# Patient Record
Sex: FEMALE | Race: WHITE | NOT HISPANIC OR LATINO | Employment: OTHER | URBAN - METROPOLITAN AREA
[De-identification: names, ages, dates, MRNs, and addresses within clinical notes are randomized per-mention and may not be internally consistent; named-entity substitution may affect disease eponyms.]

---

## 2017-01-26 ENCOUNTER — OFFICE VISIT (OUTPATIENT)
Dept: FAMILY MEDICINE | Facility: CLINIC | Age: 57
End: 2017-01-26

## 2017-01-26 VITALS
WEIGHT: 174 LBS | HEIGHT: 72 IN | SYSTOLIC BLOOD PRESSURE: 120 MMHG | HEART RATE: 71 BPM | TEMPERATURE: 97.8 F | BODY MASS INDEX: 23.57 KG/M2 | OXYGEN SATURATION: 99 % | DIASTOLIC BLOOD PRESSURE: 80 MMHG

## 2017-01-26 DIAGNOSIS — M77.52 TENDINITIS OF LEFT FOOT: Primary | ICD-10-CM

## 2017-01-26 PROCEDURE — 99213 OFFICE O/P EST LOW 20 MIN: CPT | Performed by: PHYSICIAN ASSISTANT

## 2017-01-26 PROCEDURE — 73630 X-RAY EXAM OF FOOT: CPT | Mod: LT | Performed by: PHYSICIAN ASSISTANT

## 2017-01-26 NOTE — PATIENT INSTRUCTIONS
For the next two weeks:  Take 1 tablet of naproxen/Aleve (220 mg) twice daily with food.  Heat the area 2 times for 20 minutes.  Try to elevate leg as much as possible.  Continue to wear good quality shoes in the house.  Consider wearing compression sleeves

## 2017-01-26 NOTE — PROGRESS NOTES
"CC: Foot problem    History:  For the past 2-3 weeks, Jennifer has been having pain on the top of the left foot. It gets swollen and has some red discoloration.     Jennifer has been taking advil to help with pain. Has tried icing and elevating the leg without significant relief. Has to take Advil when she has been on her foot for the day. Wears halflinger shoes from [employer]. Does some ROM exercises. No trauma to the area, break in the skin.    Was on bio identical hormone for the last 2 years, and recently went off this, and is getting hot flashes. Sees Dr. Prakash for GYN at [facility]. Was being seen in a menopause clinic. She feels like she has been getting increased hot flashes, and achiness including shooting pains down her legs.      PMH, MEDICATIONS, ALLERGIES, SOCIAL AND FAMILY HISTORY in [facility] and reviewed by me personally.      ROS negative other than the symptoms noted above in the HPI.        Examination   /80 mmHg  Pulse 71  Temp(Src) 97.8  F (36.6  C) (Oral)  Ht 1.854 m (6' 0.99\")  Wt 78.926 kg (174 lb)  BMI 22.96 kg/m2  SpO2 99%  Breastfeeding? No       Constitutional: Sitting comfortably, in no acute distress. Vital signs noted  M/S: Tender to palpation over left dorsal foot over phalynx of digits 3-5. Mild erythema and edema appreciated. ROM and strength of ankle, toes intact.  NEURO:  muscle strength normal, reflexes normal and symmetric  SKIN: No jaundice/pallor/rash.   Psychiatric: mentation appears normal and affect normal/bright        A/P    ICD-10-CM    1. Tendinitis of left foot M77.52 X-ray lt Foot G/E 3 vws*       DISCUSSION: X-ray of foot does not show any obvious abnormalities including fractures or stress fractures. Radiologist report confirms this. I believe this to be tendonitis. I recommended for the next two weeks:  Take 1 tablet of naproxen/Aleve (220 mg) twice daily with food.  Heat the area 2 times for 20 minutes.  Try to elevate leg as much as possible.  Continue to " wear good quality shoes in the house, and when active.  Consider wearing compression sleeves to help with swelling.    I would like her to contact me in 2 weeks if she is not significantly better despite these interventions.    follow up visit: As needed    [facility]

## 2017-01-26 NOTE — MR AVS SNAPSHOT
After Visit Summary   1/26/2017    Skyla Lopez    MRN: [id number]           Patient Information     Date Of Birth          1960        Visit Information        Provider Department      1/26/2017 11:30 AM Nicole Coleman PA-C [facility]        Today's Diagnoses     Right foot pain    -  1       Care Instructions    For the next two weeks:  Take 1 tablet of naproxen/Aleve (220 mg) twice daily with food.  Heat the area 2 times for 20 minutes.  Try to elevate leg as much as possible.  Continue to wear good quality shoes in the house.  Consider wearing compression sleeves        Follow-ups after your visit        Who to contact     If you have questions or need follow up information about today's clinic visit or your schedule please contact [facility] directly at [phone].  Normal or non-critical lab and imaging results will be communicated to you by Eniramhart, letter or phone within 4 business days after the clinic has received the results. If you do not hear from us within 7 days, please contact the clinic through Pieceablet or phone. If you have a critical or abnormal lab result, we will notify you by phone as soon as possible.  Submit refill requests through zerobound or call your pharmacy and they will forward the refill request to us. Please allow 3 business days for your refill to be completed.          Additional Information About Your Visit        MyChart Information     zerobound gives you secure access to your electronic health record. If you see a primary care provider, you can also send messages to your care team and make appointments. If you have questions, please call your primary care clinic.  If you do not have a primary care provider, please call [phone] and they will assist you.        Care EveryWhere ID     This is your Care EveryWhere ID. This could be used by other organizations to access your [facility]  "records  [id number]        Your Vitals Were     Pulse Temperature Height BMI (Body Mass Index) Pulse Oximetry Breastfeeding?    71 97.8  F (36.6  C) (Oral) 1.854 m (6' 0.99\") 22.96 kg/m2 99% No       Blood Pressure from Last 3 Encounters:   01/26/17 120/80   08/31/16 122/70   04/01/16 112/70    Weight from Last 3 Encounters:   01/26/17 78.926 kg (174 lb)   08/31/16 79.289 kg (174 lb 12.8 oz)   04/01/16 80.343 kg (177 lb 2 oz)              We Performed the Following     X-ray lt Foot G/E 3 vws*        Primary Care Provider Office Phone # Fax #    Cuca Wyatt [phone]       [facility] [address]  [facility] [address]        Thank you!     Thank you for choosing [facility]  for your care. Our goal is always to provide you with excellent care. Hearing back from our patients is one way we can continue to improve our services. Please take a few minutes to complete the written survey that you may receive in the mail after your visit with us. Thank you!             Your Updated Medication List - Protect others around you: Learn how to safely use, store and throw away your medicines at www.disposemymeds.org.          This list is accurate as of: 1/26/17 12:30 PM.  Always use your most recent med list.                   Brand Name Dispense Instructions for use    atenolol 25 MG tablet    TENORMIN    90 tablet    Take 1 tablet (25 mg) by mouth daily       sertraline 100 MG tablet    ZOLOFT     Take 75 mg by mouth daily         "

## 2017-01-26 NOTE — NURSING NOTE
Skyla Lopez is here for left foot pain around the ankle and swelling on the top of the foot as well.    Pre-Visit Screening :  Immunizations : up to date    Colonoscopy : schedule on a later day  Mammogram : is up to date  Asthma Action Test/Plan : NA  PHQ9/GAD7 :  NA    BP done on the left arm, with a reg sized cuff.  Pulse - regular  My Chart - accepts    CLASSIFICATION OF OVERWEIGHT AND OBESITY BY BMI                         Obesity Class           BMI(kg/m2)  Underweight                                    < 18.5  Normal                                         18.5-24.9  Overweight                                     25.0-29.9  OBESITY                     I                  30.0-34.9                              II                 35.0-39.9  EXTREME OBESITY             III                >40                             Patient's  BMI Body mass index is 22.96 kg/(m^2).  http://hin.nhlbi.nih.gov/menuplanner/menu.cgi  Questioned patient about current smoking habits.  Pt. has never smoked.

## 2017-03-02 ENCOUNTER — HOSPITAL ENCOUNTER (OUTPATIENT)
Dept: MAMMOGRAPHY | Facility: CLINIC | Age: 57
Discharge: HOME | End: 2017-03-02
Attending: OBSTETRICS & GYNECOLOGY | Admitting: OBSTETRICS & GYNECOLOGY
Payer: COMMERCIAL

## 2017-03-02 DIAGNOSIS — Z12.31 ENCOUNTER FOR SCREENING MAMMOGRAM FOR HIGH-RISK PATIENT: ICD-10-CM

## 2017-03-02 PROCEDURE — 77063 BREAST TOMOSYNTHESIS BI: CPT

## 2017-04-10 ENCOUNTER — OFFICE VISIT (OUTPATIENT)
Dept: FAMILY MEDICINE | Facility: CLINIC | Age: 57
End: 2017-04-10

## 2017-04-10 VITALS
HEART RATE: 64 BPM | WEIGHT: 177.8 LBS | SYSTOLIC BLOOD PRESSURE: 114 MMHG | BODY MASS INDEX: 23.46 KG/M2 | DIASTOLIC BLOOD PRESSURE: 70 MMHG | OXYGEN SATURATION: 99 % | TEMPERATURE: 97.9 F

## 2017-04-10 DIAGNOSIS — M54.50 CHRONIC MIDLINE LOW BACK PAIN WITHOUT SCIATICA: Primary | ICD-10-CM

## 2017-04-10 DIAGNOSIS — F41.1 GENERALIZED ANXIETY DISORDER: ICD-10-CM

## 2017-04-10 DIAGNOSIS — G89.29 CHRONIC MIDLINE LOW BACK PAIN WITHOUT SCIATICA: Primary | ICD-10-CM

## 2017-04-10 LAB
% GRANULOCYTES: 53.4 %
ALBUMIN (URINE): ABNORMAL MG/DL
APPEARANCE UR: CLEAR
BACTERIA, UR: ABNORMAL
BILIRUB UR QL: ABNORMAL
CASTS/LPF: ABNORMAL
COLOR UR: YELLOW
EP/HPF: ABNORMAL
ERYTHROCYTE [SEDIMENTATION RATE] IN BLOOD: 6 MM/HR (ref 0–20)
GLUCOSE URINE: ABNORMAL MG/DL
HCT VFR BLD AUTO: 40.2 % (ref 35–47)
HEMOGLOBIN: 13.1 G/DL (ref 11.7–15.7)
HGB UR QL: ABNORMAL
KETONES UR QL: ABNORMAL MG/DL
LEUKOCYTE ESTERASE - QUEST: ABNORMAL
LYMPHOCYTES NFR BLD AUTO: 38.2 %
MCH RBC QN AUTO: 30.5 PG (ref 26–33)
MCHC RBC AUTO-ENTMCNC: 32.6 G/DL (ref 31–36)
MCV RBC AUTO: 93.8 FL (ref 78–100)
MISC.: ABNORMAL
MONOCYTES NFR BLD AUTO: 8.4 %
NITRITE UR QL STRIP: ABNORMAL
PH UR STRIP: 7 PH (ref 5–7)
PLATELET COUNT - QUEST: 278 10^9/L (ref 150–375)
RBC # BLD AUTO: 4.29 10*12/L (ref 3.8–5.2)
RBC, UR MICRO: ABNORMAL (ref ?–2)
SP. GRAVITY: 1.01
UROBILINOGEN UR QL STRIP: 0.2 EU/DL (ref 0.2–1)
WBC # BLD AUTO: 6.4 10*9/L (ref 4–11)
WBC, UR MICRO: ABNORMAL (ref ?–2)

## 2017-04-10 PROCEDURE — 85025 COMPLETE CBC W/AUTO DIFF WBC: CPT | Performed by: FAMILY MEDICINE

## 2017-04-10 PROCEDURE — 73521 X-RAY EXAM HIPS BI 2 VIEWS: CPT | Performed by: FAMILY MEDICINE

## 2017-04-10 PROCEDURE — 99214 OFFICE O/P EST MOD 30 MIN: CPT | Performed by: FAMILY MEDICINE

## 2017-04-10 PROCEDURE — 85651 RBC SED RATE NONAUTOMATED: CPT | Performed by: FAMILY MEDICINE

## 2017-04-10 PROCEDURE — 36415 COLL VENOUS BLD VENIPUNCTURE: CPT | Performed by: FAMILY MEDICINE

## 2017-04-10 PROCEDURE — 72110 X-RAY EXAM L-2 SPINE 4/>VWS: CPT | Performed by: FAMILY MEDICINE

## 2017-04-10 PROCEDURE — 81001 URINALYSIS AUTO W/SCOPE: CPT | Performed by: FAMILY MEDICINE

## 2017-04-10 NOTE — MR AVS SNAPSHOT
After Visit Summary   4/10/2017    Skyla Lopez    MRN: [id number]           Patient Information     Date Of Birth          1960        Visit Information        Provider Department      4/10/2017 2:00 PM Cuca Wyatt MD [facility]        Today's Diagnoses     Chronic midline low back pain without sciatica    -  1    Generalized anxiety disorder           Follow-ups after your visit        Future tests that were ordered for you today     Open Future Orders        Priority Expected Expires Ordered    US Pelvic Complete with Transvaginal Routine 7/9/2017 4/10/2018 4/10/2017            Who to contact     If you have questions or need follow up information about today's clinic visit or your schedule please contact [facility] directly at [phone].  Normal or non-critical lab and imaging results will be communicated to you by MyChart, letter or phone within 4 business days after the clinic has received the results. If you do not hear from us within 7 days, please contact the clinic through MyChart or phone. If you have a critical or abnormal lab result, we will notify you by phone as soon as possible.  Submit refill requests through mobifriends or call your pharmacy and they will forward the refill request to us. Please allow 3 business days for your refill to be completed.          Additional Information About Your Visit        MyChart Information     mobifriends gives you secure access to your electronic health record. If you see a primary care provider, you can also send messages to your care team and make appointments. If you have questions, please call your primary care clinic.  If you do not have a primary care provider, please call [phone] and they will assist you.        Care EveryWhere ID     This is your Care EveryWhere ID. This could be used by other organizations to access your [place] medical records  [id number]         Your Vitals Were     Pulse Temperature Pulse Oximetry BMI (Body Mass Index)          64 97.9  F (36.6  C) (Oral) 99% 23.46 kg/m2         Blood Pressure from Last 3 Encounters:   04/10/17 114/70   01/26/17 120/80   08/31/16 122/70    Weight from Last 3 Encounters:   04/10/17 80.6 kg (177 lb 12.8 oz)   01/26/17 78.9 kg (174 lb)   08/31/16 79.3 kg (174 lb 12.8 oz)              We Performed the Following     CL AFF HEMOGRAM/PLATE/DIFF ([facility])     ESR, WESTERGREN ([facility])     HCL URINALYSIS, ROUTINE     VENOUS COLLECTION     XR Lumbar Spine G/E 4 Views     XR Pelvis and Hip Bilateral 2 Views        Primary Care Provider Office Phone # Fax #    Cuca Wyatt [phone]       [facility] [address]  [facility] [address]        Thank you!     Thank you for choosing [facility]  for your care. Our goal is always to provide you with excellent care. Hearing back from our patients is one way we can continue to improve our services. Please take a few minutes to complete the written survey that you may receive in the mail after your visit with us. Thank you!             Your Updated Medication List - Protect others around you: Learn how to safely use, store and throw away your medicines at www.disposemymeds.org.          This list is accurate as of: 4/10/17 11:59 PM.  Always use your most recent med list.                   Brand Name Dispense Instructions for use    atenolol 25 MG tablet    TENORMIN    90 tablet    Take 1 tablet (25 mg) by mouth daily       sertraline 100 MG tablet    ZOLOFT     Take 75 mg by mouth daily

## 2017-04-10 NOTE — PROGRESS NOTES
SUBJECTIVE:  56 year old female who has a history of pelvic pain, treated with PT, also a history of SI strain, presents with the following concern:    Three years ago, coccyx pain- worse with sitting, and getting up out of a chair    Then, pelvic pain- Dr Prakash referred for PT/- pelvic muscles  She also had serial pelvic ultrasounds    In the last couple of weeks, low back pain, midline but with radiation to both hips and anterior into pelvis    Two years of HRT- now off  Worries about GYN etiology of her current symptoms    history of endometriosis-    History of scoliosis  Saw her chiro today, given a low back support belt and adjustment    Patient Active Problem List   Diagnosis     Generalized anxiety disorder     Mitral valve insufficiency and aortic valve insufficiency     Health Care Home     Panic disorder with agoraphobia and moderate panic attacks     Tachycardia     Plantar fasciitis     ACP (advance care planning)     High serum vitamin B12     Endometrial polyp     Past Surgical History:   Procedure Laterality Date     APPENDECTOMY       CRYOPRESERVATION REPRODUCTIVE TISSUE, OVARIAN  1982    left ovary removed/borderline tumor     DILATION AND CURETTAGE, OPERATIVE HYSTEROSCOPY WITH MORCELLATOR, COMBINED N/A 6/4/2015    Procedure: COMBINED DILATION AND CURETTAGE, OPERATIVE HYSTEROSCOPY WITH MORCELLATOR;  Surgeon: Hamida Prakash MD;  Location: [facility] REMOVAL OF TONSILS,12+ Y/O  age 23    Tonsils 13+y.o.     LAPAROSCOPIC FUNDOPLASTY  1990/92/93    endometriosis     Current Outpatient Prescriptions   Medication     atenolol (TENORMIN) 25 MG tablet     sertraline (ZOLOFT) 100 MG tablet     No current facility-administered medications for this visit.       ROS: 7 point ROS neg other than the symptoms noted above in the HPI.  Having mild hot flashes- discussed flaxseed as a possible treatment  History of generalized anxiety- her current symptoms do trigger some anxiety symptoms    OBJECTIVE:  BP  114/70 (BP Location: Left arm, Patient Position: Chair, Cuff Size: Adult Regular)  Pulse 64  Temp 97.9  F (36.6  C) (Oral)  Wt 80.6 kg (177 lb 12.8 oz)  SpO2 99%  BMI 23.46 kg/m2   Has difficulty getting up from chair or exam table when lying  The abdomen is soft without tenderness, guarding, mass or organomegaly. Bowel sounds are normal. No CVA tenderness.    Prominent thoracic lordosis  Normal straight leg raising.  No weakness of the lower extremities; reflexes are reduced but symmmetrical.  Gait is normal.  No palpable muscle spasm or point tenderness of the vertebrae.   passive ROM of both hips without pain.  X-ray: normal lumbar and pelvic x-rays    Assessment   Lumbar strain  Scoliosis  History of endometriosis- follow up with Dr Prakash regarding pelvic ultrasound imaging  Offered ANSAID- defers  Offered referral to PT (she will consider and let me know if she wishes a referral)  For now, follow up with chiro and gyn

## 2017-04-10 NOTE — NURSING NOTE
Skyla is here for back pain.     Pre-Visit Screening :  Immunizations : up to date  Colonoscopy : is up to date  Mammogram : is up to date  Asthma Action Test/Plan : NA  PHQ9/GAD7 :  NA      Pulse - regular  My Chart - accepts    CLASSIFICATION OF OVERWEIGHT AND OBESITY BY BMI                         Obesity Class           BMI(kg/m2)  Underweight                                    < 18.5  Normal                                         18.5-24.9  Overweight                                     25.0-29.9  OBESITY                     I                  30.0-34.9                              II                 35.0-39.9  EXTREME OBESITY             III                >40                             Patient's  BMI There is no height or weight on file to calculate BMI.  http://hin.nhlbi.nih.gov/menuplanner/menu.cgi  Questioned patient about current smoking habits.  Pt. has never smoked.    Emerald.QUETA Putnam ([facility])

## 2017-04-17 ENCOUNTER — OFFICE VISIT (OUTPATIENT)
Dept: FAMILY MEDICINE | Facility: CLINIC | Age: 57
End: 2017-04-17

## 2017-04-17 VITALS
HEART RATE: 69 BPM | TEMPERATURE: 98.2 F | WEIGHT: 175.2 LBS | SYSTOLIC BLOOD PRESSURE: 118 MMHG | OXYGEN SATURATION: 99 % | DIASTOLIC BLOOD PRESSURE: 80 MMHG | BODY MASS INDEX: 23.12 KG/M2

## 2017-04-17 DIAGNOSIS — M53.3 TAIL BONE PAIN: ICD-10-CM

## 2017-04-17 DIAGNOSIS — M54.50 ACUTE RIGHT-SIDED LOW BACK PAIN WITHOUT SCIATICA: Primary | ICD-10-CM

## 2017-04-17 PROCEDURE — 99213 OFFICE O/P EST LOW 20 MIN: CPT | Performed by: PHYSICIAN ASSISTANT

## 2017-04-17 NOTE — NURSING NOTE
Skyla is here for back pain    Pre-Visit Screening :  Immunizations : up to date  Colonoscopy : is up to date  Mammogram : is up to date  Asthma Action Test/Plan : NA  PHQ9/GAD7 :  NA  Pulse - regular  My Chart - accepts    CLASSIFICATION OF OVERWEIGHT AND OBESITY BY BMI                         Obesity Class           BMI(kg/m2)  Underweight                                    < 18.5  Normal                                         18.5-24.9  Overweight                                     25.0-29.9  OBESITY                     I                  30.0-34.9                              II                 35.0-39.9  EXTREME OBESITY             III                >40                             Patient's  BMI Body mass index is 23.12 kg/(m^2).  http://hin.nhlbi.nih.gov/menuplanner/menu.cgi  Questioned patient about current smoking habits.  Pt. has never smoked.

## 2017-04-17 NOTE — MR AVS SNAPSHOT
After Visit Summary   4/17/2017    Skyla Lopez    MRN: [id number]           Patient Information     Date Of Birth          1960        Visit Information        Provider Department      4/17/2017 11:30 AM Nicole Colmean PA-C [facility]        Today's Diagnoses     Acute right-sided low back pain without sciatica    -  1    Tail bone pain           Follow-ups after your visit        Follow-up notes from your care team     Return if symptoms worsen or fail to improve.      Future tests that were ordered for you today     Open Future Orders        Priority Expected Expires Ordered    MR Lumbar Spine w/o & w Contrast Routine  4/17/2018 4/17/2017            Who to contact     If you have questions or need follow up information about today's clinic visit or your schedule please contact [facility] directly at [phone].  Normal or non-critical lab and imaging results will be communicated to you by MyChart, letter or phone within 4 business days after the clinic has received the results. If you do not hear from us within 7 days, please contact the clinic through Mobile Safe Casehart or phone. If you have a critical or abnormal lab result, we will notify you by phone as soon as possible.  Submit refill requests through [employer] or call your pharmacy and they will forward the refill request to us. Please allow 3 business days for your refill to be completed.          Additional Information About Your Visit        MyChart Information     Trig Medical gives you secure access to your electronic health record. If you see a primary care provider, you can also send messages to your care team and make appointments. If you have questions, please call your primary care clinic.  If you do not have a primary care provider, please call [phone] and they will assist you.        Care EveryWhere ID     This is your Care EveryWhere ID. This could be used by other  organizations to access your [facility] medical records  [id number]        Your Vitals Were     Pulse Temperature Pulse Oximetry BMI (Body Mass Index)          69 98.2  F (36.8  C) (Oral) 99% 23.12 kg/m2         Blood Pressure from Last 3 Encounters:   04/17/17 118/80   04/10/17 114/70   01/26/17 120/80    Weight from Last 3 Encounters:   04/17/17 79.5 kg (175 lb 3.2 oz)   04/10/17 80.6 kg (177 lb 12.8 oz)   01/26/17 78.9 kg (174 lb)               Primary Care Provider Office Phone # Fax #    Cuca Wyatt [phone]       [facility] [address] [facility] [address]  [facility] [address]        Thank you!     Thank you for choosing [facility]  for your care. Our goal is always to provide you with excellent care. Hearing back from our patients is one way we can continue to improve our services. Please take a few minutes to complete the written survey that you may receive in the mail after your visit with us. Thank you!             Your Updated Medication List - Protect others around you: Learn how to safely use, store and throw away your medicines at www.disposemymeds.org.          This list is accurate as of: 4/17/17  2:39 PM.  Always use your most recent med list.                   Brand Name Dispense Instructions for use    atenolol 25 MG tablet    TENORMIN    90 tablet    Take 1 tablet (25 mg) by mouth daily       sertraline 100 MG tablet    ZOLOFT     Take 75 mg by mouth daily

## 2017-04-17 NOTE — PROGRESS NOTES
CC: Back pain    History:  For the past 3 weeks, Jennifer has been having significant low back pain that is somewhat focused on right side of her spinal column.     Jennifer saw Dr. Wyatt for this on 4/10 and lumbar and pelvic x-ray was negative. UA, ESR, and CBC were negative as well. Dr. Wyatt recommended physical therapy, but also mentioned that she should have a vaginal US to rule out a gynecological process causing her symptoms. She is scheduled for a pelvic US on Monday, April 24.     The pain radiates into hips bilaterally and she also feels a cramping pain in her lower abdomen. Hurts the worst with standing or sitting for a long time. The pain is not traveling down legs, no numbness or tingling, no loss of bowel or bladder control. Has been icing, heating, (whirlpool), stretching, chiropractor, and acupuncture without much relief. Has been taking 2 tablets of ibuprofen daily.     She also mentions the tip of her tailbone hurts to the touch for the past 4-6 months. No digestion concerns, but does mention that she has not yet had a colonoscopy.    Was on bioidentical hormones until November 2016.    PMH, MEDICATIONS, ALLERGIES, SOCIAL AND FAMILY HISTORY in [facility] and reviewed by me personally.      ROS negative other than the symptoms noted above in the HPI.        Examination   /80 (BP Location: Left arm, Patient Position: Chair, Cuff Size: Adult Regular)  Pulse 69  Temp 98.2  F (36.8  C) (Oral)  Wt 79.5 kg (175 lb 3.2 oz)  SpO2 99%  BMI 23.12 kg/m2       Constitutional: Sitting comfortably, in no acute distress. Vital signs noted  Neck:  no adenopathy, trachea midline and normal to palpation  Cardiovascular:  regular rate and rhythm, no murmurs, clicks, or gallops  Respiratory:  normal respiratory rate and rhythm, lungs clear to auscultation  Abdomen: Abdomen soft, non-tender. BS normal. No masses, organomegaly  M/S: ROM of back full, other than 50% of normal extension, and pain with extension, right  lean, and rotational movements. No CVA tenderness. Tenderness to palpation at L4/L5 level immediately to the right or vertebral column.  NEURO:  muscle strength normal, reflexes normal and symmetric  SKIN: No jaundice/pallor/rash.   Psychiatric: mentation appears normal and affect normal/bright        A/P    ICD-10-CM    1. Acute right-sided low back pain without sciatica M54.5 MR Lumbar Spine w/o & w Contrast    suspiscious for disc abnormality   2. Tail bone pain M53.3 MR Lumbar Spine w/o & w Contrast    ongoing for 4-6 months       DISCUSSION: Jennifer is very uncomfortable with this pain. I recommended she take Aleve 1 tablet twice daily with food, and apply heat 2-3 times daily for 20 mins. She should schedule with [facility] to have ortho consult for low back pain, but in meantime she can get MRI of back at sitting up machine at [facility]. She will hopefully schedule this this week, and then have pelvic ultrasound on Monday as schedule. If both this are negative, we should consider abdominal causes. Jennifer understands and agrees with this plan, and will contact me with any questions, and consider ER if symptoms worsen.    follow up visit: As needed    Nicole Coleman PA-C  [facility]

## 2017-04-17 NOTE — Clinical Note
Please contact [facility] with MRI order. Patient needs standing up MRI located in [facility] for her claustrophobia. Pt is expecting call from [facility] to schedule this week.

## 2017-04-18 ENCOUNTER — MYC MEDICAL ADVICE (OUTPATIENT)
Dept: FAMILY MEDICINE | Facility: CLINIC | Age: 57
End: 2017-04-18

## 2017-04-18 ENCOUNTER — TELEPHONE (OUTPATIENT)
Dept: FAMILY MEDICINE | Facility: CLINIC | Age: 57
End: 2017-04-18

## 2017-04-18 DIAGNOSIS — M53.3 PAIN IN THE COCCYX: Primary | ICD-10-CM

## 2017-04-18 NOTE — TELEPHONE ENCOUNTER
[facility] called to inform Nicole Coleman that they can not perform imaging of the Sacrum Coccyx in the Lumbar MRI that it would have to be ordered separately.  Would you like to place a separate order to have this imaging performed?    Please Advise    [facility] call back: [phone]    Meri Robert CMA

## 2017-04-18 NOTE — TELEPHONE ENCOUNTER
Ordered MRI of sacrum and coccyx. Pt should be informed this may be an additional charge before she has imaging. She may want to check with insurance.

## 2017-04-19 ENCOUNTER — TRANSFERRED RECORDS (OUTPATIENT)
Dept: FAMILY MEDICINE | Facility: CLINIC | Age: 57
End: 2017-04-19

## 2017-04-21 ENCOUNTER — TRANSFERRED RECORDS (OUTPATIENT)
Dept: FAMILY MEDICINE | Facility: CLINIC | Age: 57
End: 2017-04-21

## 2017-04-24 ENCOUNTER — TRANSFERRED RECORDS (OUTPATIENT)
Dept: FAMILY MEDICINE | Facility: CLINIC | Age: 57
End: 2017-04-24

## 2017-06-24 ENCOUNTER — HEALTH MAINTENANCE LETTER (OUTPATIENT)
Age: 57
End: 2017-06-24

## 2017-08-24 ENCOUNTER — OFFICE VISIT (OUTPATIENT)
Dept: FAMILY MEDICINE | Facility: CLINIC | Age: 57
End: 2017-08-24

## 2017-08-24 VITALS
BODY MASS INDEX: 23.3 KG/M2 | SYSTOLIC BLOOD PRESSURE: 116 MMHG | TEMPERATURE: 97.6 F | HEART RATE: 60 BPM | WEIGHT: 172 LBS | OXYGEN SATURATION: 99 % | HEIGHT: 72 IN | DIASTOLIC BLOOD PRESSURE: 80 MMHG

## 2017-08-24 DIAGNOSIS — Z13.220 LIPID SCREENING: Primary | ICD-10-CM

## 2017-08-24 DIAGNOSIS — N95.1 SYMPTOMATIC MENOPAUSAL OR FEMALE CLIMACTERIC STATES: ICD-10-CM

## 2017-08-24 DIAGNOSIS — Z13.1 SCREENING FOR DIABETES MELLITUS: ICD-10-CM

## 2017-08-24 DIAGNOSIS — Z13.9 SCREENING FOR CONDITION: ICD-10-CM

## 2017-08-24 DIAGNOSIS — R00.0 TACHYCARDIA: ICD-10-CM

## 2017-08-24 PROCEDURE — 80061 LIPID PANEL: CPT | Mod: 90 | Performed by: FAMILY MEDICINE

## 2017-08-24 PROCEDURE — 80048 BASIC METABOLIC PNL TOTAL CA: CPT | Mod: 90 | Performed by: FAMILY MEDICINE

## 2017-08-24 PROCEDURE — 99213 OFFICE O/P EST LOW 20 MIN: CPT | Performed by: FAMILY MEDICINE

## 2017-08-24 PROCEDURE — 82607 VITAMIN B-12: CPT | Mod: 90 | Performed by: FAMILY MEDICINE

## 2017-08-24 PROCEDURE — 83735 ASSAY OF MAGNESIUM: CPT | Mod: 90 | Performed by: FAMILY MEDICINE

## 2017-08-24 PROCEDURE — 36415 COLL VENOUS BLD VENIPUNCTURE: CPT | Performed by: FAMILY MEDICINE

## 2017-08-24 PROCEDURE — 82306 VITAMIN D 25 HYDROXY: CPT | Mod: 90 | Performed by: FAMILY MEDICINE

## 2017-08-24 RX ORDER — METOPROLOL SUCCINATE 25 MG/1
25 TABLET, EXTENDED RELEASE ORAL DAILY
Qty: 90 TABLET | Refills: 3 | Status: SHIPPED | OUTPATIENT
Start: 2017-08-24 | End: 2018-08-30

## 2017-08-24 RX ORDER — ATENOLOL 25 MG/1
25 TABLET ORAL DAILY
Qty: 90 TABLET | Refills: 3 | Status: SHIPPED | OUTPATIENT
Start: 2017-08-24 | End: 2018-08-15

## 2017-08-24 NOTE — PATIENT INSTRUCTIONS
Recommended amount of daily Vitamin D3 is 1000 IU    Metoprolol may need to be substituted for atenolol.

## 2017-08-24 NOTE — MR AVS SNAPSHOT
After Visit Summary   8/24/2017    Skyla Lopez    MRN: [id number]           Patient Information     Date Of Birth          1960        Visit Information        Provider Department      8/24/2017 7:30 AM Cuca Wyatt MD [facility]        Today's Diagnoses     Lipid screening    -  1    Screening for diabetes mellitus        Tachycardia        Screening for condition          Care Instructions    Recommended amount of daily Vitamin D3 is 1000 IU    Metoprolol may need to be substituted for atenolol.          Follow-ups after your visit        Who to contact     If you have questions or need follow up information about today's clinic visit or your schedule please contact [facility] directly at [phone].  Normal or non-critical lab and imaging results will be communicated to you by Metaconomyhart, letter or phone within 4 business days after the clinic has received the results. If you do not hear from us within 7 days, please contact the clinic through Metaconomyhart or phone. If you have a critical or abnormal lab result, we will notify you by phone as soon as possible.  Submit refill requests through Pivot3 or call your pharmacy and they will forward the refill request to us. Please allow 3 business days for your refill to be completed.          Additional Information About Your Visit        MyChart Information     Pivot3 gives you secure access to your electronic health record. If you see a primary care provider, you can also send messages to your care team and make appointments. If you have questions, please call your primary care clinic.  If you do not have a primary care provider, please call [phone] and they will assist you.        Care EveryWhere ID     This is your Care EveryWhere ID. This could be used by other organizations to access your [place] medical records  [id number]        Your Vitals Were     Pulse Temperature  "Height Pulse Oximetry Breastfeeding? BMI (Body Mass Index)    60 97.6  F (36.4  C) (Oral) 1.835 m (6' 0.25\") 99% No 23.17 kg/m2       Blood Pressure from Last 3 Encounters:   08/24/17 116/80   04/17/17 118/80   04/10/17 114/70    Weight from Last 3 Encounters:   08/24/17 78 kg (172 lb)   04/17/17 79.5 kg (175 lb 3.2 oz)   04/10/17 80.6 kg (177 lb 12.8 oz)              We Performed the Following     BASIC METABOLIC PANEL (QUEST)     Lipid Profile (QUEST)     Magnesium (QUEST)     VENOUS COLLECTION     VITAMIN B12 (QUEST)     Vitamin D Deficiency          Today's Medication Changes          These changes are accurate as of: 8/24/17  7:54 AM.  If you have any questions, ask your nurse or doctor.               Start taking these medicines.        Dose/Directions    metoprolol 25 MG 24 hr tablet   Commonly known as:  TOPROL-XL   Used for:  Tachycardia   Started by:  Cuca Wyatt MD        Dose:  25 mg   Take 1 tablet (25 mg) by mouth daily   Quantity:  90 tablet   Refills:  3            Where to get your medicines      [facility] [address] [place] [address] AT [place] [address] & [address] [address]    Hours:  24-hours Phone:  [phone]     metoprolol 25 MG 24 hr tablet         Some of these will need a paper prescription and others can be bought over the counter.  Ask your nurse if you have questions.     Bring a paper prescription for each of these medications     atenolol 25 MG tablet                Primary Care Provider Office Phone # Fax #    Cuca Wyatt [phone] [address] [address]        Equal Access to Services     [facility] JACOB AH: Paty Patton, wamartita singh, qaybta kaalrussell roman, frank magaña. So [facility] [phone].    ATENCIÓN: Si habla español, tiene a cano disposición servicios gratuitos de asistencia " lingüística. Kylie al [phone].    We comply with applicable federal civil rights laws and [place] laws. We do not discriminate on the basis of race, color, national origin, age, disability sex, sexual orientation or gender identity.            Thank you!     Thank you for choosing [facility]  for your care. Our goal is always to provide you with excellent care. Hearing back from our patients is one way we can continue to improve our services. Please take a few minutes to complete the written survey that you may receive in the mail after your visit with us. Thank you!             Your Updated Medication List - Protect others around you: Learn how to safely use, store and throw away your medicines at www.disposemymeds.org.          This list is accurate as of: 8/24/17  7:54 AM.  Always use your most recent med list.                   Brand Name Dispense Instructions for use Diagnosis    atenolol 25 MG tablet    TENORMIN    90 tablet    Take 1 tablet (25 mg) by mouth daily    Tachycardia       metoprolol 25 MG 24 hr tablet    TOPROL-XL    90 tablet    Take 1 tablet (25 mg) by mouth daily    Tachycardia

## 2017-08-24 NOTE — PROGRESS NOTES
"SUBJECTIVE:  56 year old female presents with the following concerns    1)Fasting lab work requested:  See orders in epic     2) Health Care Maintenance reviewed  Encouraged patient to get a flu shot    3) refill of atenolol- good control of symptoms  No break through palpitations-  Discussed shortage of atenolol- my need to substitute metoprolol     4)Menopause symptoms: off hormones for a year  Still gets a few hot flashes-   Sleeping could be better- but doing well    5)History of high B12  Not taking Vitamin D (Takes a multivitamin, krill oil, vitamin C, magnesium spray on feet)    Regular walker, good diet    OBJECTIVE:  /80 (BP Location: Left arm, Patient Position: Chair, Cuff Size: Adult Regular)  Pulse 60  Temp 97.6  F (36.4  C) (Oral)  Ht 1.835 m (6' 0.25\")  Wt 78 kg (172 lb)  SpO2 99%  Breastfeeding? No  BMI 23.17 kg/m2   HR 64  Regular rate and  rhythm. S1 and S2 normal, no murmurs, clicks, gallops or rubs. No edema or JVD. Chest is clear; no wheezes or rales.    Assessment   (Z13.220) Lipid screening  (primary encounter diagnosis)  Comment:   Plan: VENOUS COLLECTION            (Z13.1) Screening for diabetes mellitus  Comment:   Plan: BASIC METABOLIC PANEL (QUEST), VENOUS         COLLECTION            (R00.0) Tachycardia  Comment:   Plan: atenolol (TENORMIN) 25 MG tablet, metoprolol         (TOPROL-XL) 25 MG 24 hr tablet            (Z13.9) Screening for condition  Comment:   Plan: Vitamin D Deficiency, Magnesium (QUEST),         VITAMIN B12 (QUEST)                 "

## 2017-08-24 NOTE — NURSING NOTE
Jennifer is here for a fasting medication recheck .    Pre-Visit Screening :  Immunizations : up to date    Colonoscopy : Postponed  Mammogram : is up to date  Asthma Action Test/Plan : NA  PHQ9/GAD7 :  NA      Pulse - regular  My Chart - accepts    CLASSIFICATION OF OVERWEIGHT AND OBESITY BY BMI                         Obesity Class           BMI(kg/m2)  Underweight                                    < 18.5  Normal                                         18.5-24.9  Overweight                                     25.0-29.9  OBESITY                     I                  30.0-34.9                              II                 35.0-39.9  EXTREME OBESITY             III                >40                             Patient's  BMI Body mass index is 23.17 kg/(m^2).  http://hin.nhlbi.nih.gov/menuplanner/menu.cgi  Questioned patient about current smoking habits.  Pt. has never smoked.    QUETA Scott ([facility])

## 2017-08-25 LAB
BUN SERPL-MCNC: 19 MG/DL (ref 7–25)
BUN/CREATININE RATIO: NORMAL (CALC) (ref 6–22)
CALCIUM SERPL-MCNC: 9.6 MG/DL (ref 8.6–10.4)
CHLORIDE SERPLBLD-SCNC: 103 MMOL/L (ref 98–110)
CHOLEST SERPL-MCNC: 233 MG/DL
CHOLEST/HDLC SERPL: 4.7 (CALC)
CO2 SERPL-SCNC: 28 MMOL/L (ref 20–31)
CREAT SERPL-MCNC: 0.86 MG/DL (ref 0.5–1.05)
EGFR AFRICAN AMERICAN - QUEST: 88 ML/MIN/1.73M2
GFR SERPL CREATININE-BSD FRML MDRD: 76 ML/MIN/1.73M2
GLUCOSE - QUEST: 86 MG/DL (ref 65–99)
HDLC SERPL-MCNC: 50 MG/DL
LDLC SERPL CALC-MCNC: 165 MG/DL (CALC)
MAGNESIUM SERPL-MCNC: 2.1 MG/DL (ref 1.5–2.5)
NONHDLC SERPL-MCNC: 183 MG/DL (CALC)
POTASSIUM SERPL-SCNC: 4.4 MMOL/L (ref 3.5–5.3)
SODIUM SERPL-SCNC: 140 MMOL/L (ref 135–146)
TRIGL SERPL-MCNC: 79 MG/DL
VIT B12 SERPL-MCNC: 857 PG/ML (ref 200–1100)
VITAMIN D, 25-OH, TOTAL - QUEST: 37 NG/ML (ref 30–100)

## 2017-11-16 ENCOUNTER — ALLIED HEALTH/NURSE VISIT (OUTPATIENT)
Dept: NURSING | Facility: CLINIC | Age: 57
End: 2017-11-16
Payer: COMMERCIAL

## 2017-11-16 DIAGNOSIS — Z23 NEED FOR PROPHYLACTIC VACCINATION AND INOCULATION AGAINST INFLUENZA: Primary | ICD-10-CM

## 2017-11-16 PROCEDURE — 90686 IIV4 VACC NO PRSV 0.5 ML IM: CPT

## 2017-11-16 PROCEDURE — 90471 IMMUNIZATION ADMIN: CPT

## 2017-11-16 NOTE — MR AVS SNAPSHOT
After Visit Summary   11/16/2017    Skyla Lopez    MRN: [id number]           Patient Information     Date Of Birth          1960        Visit Information        Provider Department      11/16/2017 2:30 PM CHUCK SARAH/LPN [facility]        Today's Diagnoses     Need for prophylactic vaccination and inoculation against influenza    -  1       Follow-ups after your visit        Who to contact     If you have questions or need follow up information about today's clinic visit or your schedule please contact [facility] directly at [phone].  Normal or non-critical lab and imaging results will be communicated to you by [employer]hart, letter or phone within 4 business days after the clinic has received the results. If you do not hear from us within 7 days, please contact the clinic through OneAwayt or phone. If you have a critical or abnormal lab result, we will notify you by phone as soon as possible.  Submit refill requests through InSample or call your pharmacy and they will forward the refill request to us. Please allow 3 business days for your refill to be completed.          Additional Information About Your Visit        MyChart Information     InSample gives you secure access to your electronic health record. If you see a primary care provider, you can also send messages to your care team and make appointments. If you have questions, please call your primary care clinic.  If you do not have a primary care provider, please call [phone] and they will assist you.        Care EveryWhere ID     This is your Care EveryWhere ID. This could be used by other organizations to access your [place] medical records  [id number]         Blood Pressure from Last 3 Encounters:   08/24/17 116/80   04/17/17 118/80   04/10/17 114/70    Weight from Last 3 Encounters:   08/24/17 172 lb (78 kg)   04/17/17 175 lb 3.2 oz (79.5 kg)   04/10/17 177 lb 12.8 oz (80.6 kg)              We  Performed the Following     FLU VAC, SPLIT VIRUS IM > 3 YO (QUADRIVALENT) [12934]     Vaccine Administration, Initial [60910]        Primary Care Provider Office Phone # Fax #    Cuca Wyatt [phone] [address] [address]        Equal Access to Services     [facility]: Hadii aad ku hadasho Soomaali, waaxda luqadaha, qaybta kaalmada adeegyada, waxay idiin hayaan adeeg khsreejenna lajosephn . So [facility] [phone].    ATENCIÓN: Si habla español, tiene a cano disposición servicios gratuitos de asistencia lingüística. [facility] [phone].    We comply with applicable federal civil rights laws and [place] laws. We do not discriminate on the basis of race, color, national origin, age, disability, sex, sexual orientation, or gender identity.            Thank you!     Thank you for choosing [facility]  for your care. Our goal is always to provide you with excellent care. Hearing back from our patients is one way we can continue to improve our services. Please take a few minutes to complete the written survey that you may receive in the mail after your visit with us. Thank you!             Your Updated Medication List - Protect others around you: Learn how to safely use, store and throw away your medicines at www.disposemymeds.org.          This list is accurate as of: 11/16/17  2:46 PM.  Always use your most recent med list.                   Brand Name Dispense Instructions for use Diagnosis    atenolol 25 MG tablet    TENORMIN    90 tablet    Take 1 tablet (25 mg) by mouth daily    Tachycardia       metoprolol 25 MG 24 hr tablet    TOPROL-XL    90 tablet    Take 1 tablet (25 mg) by mouth daily    Tachycardia

## 2018-03-23 ENCOUNTER — OFFICE VISIT (OUTPATIENT)
Dept: SLEEP MEDICINE | Facility: CLINIC | Age: 58
End: 2018-03-23
Payer: COMMERCIAL

## 2018-03-23 VITALS
OXYGEN SATURATION: 100 % | SYSTOLIC BLOOD PRESSURE: 121 MMHG | BODY MASS INDEX: 23.43 KG/M2 | RESPIRATION RATE: 16 BRPM | WEIGHT: 173 LBS | DIASTOLIC BLOOD PRESSURE: 74 MMHG | HEART RATE: 65 BPM | HEIGHT: 72 IN

## 2018-03-23 DIAGNOSIS — F51.04 PSYCHOPHYSIOLOGIC INSOMNIA: Primary | ICD-10-CM

## 2018-03-23 PROCEDURE — 99204 OFFICE O/P NEW MOD 45 MIN: CPT | Performed by: INTERNAL MEDICINE

## 2018-03-23 NOTE — PATIENT INSTRUCTIONS
1. Insomnia     - Follow the following behavioral changes to manage insomnia   - Postpone your bedtime by an hour   - Practice relaxation techniques at bedtime. Do 20 deep breathing exercises and after this visual imagery( imagining yourself in a calm and peaceful place and immersing yourself totally in this situation)  - keep you wake time consistent in the morning   - Consultation with Dr. Aguiar, behavioral sleep specialist    You have symptoms of insomnia and would likely benefit from non-medication approaches to treatment.  Cognitive behavioral treatment (CBT) for insomnia is a very effective technique that involves changing your behaviors and thoughts associated with sleep.  People that are motivated to make changes in their sleep pattern are likely to benefit from self-help strategies for treatment of insomnia.  Several treatment books for insomnia are listed on our patient treatment resources.      Suggested Resources  Insomnia Treatment Books     Overcoming Insomnia by Sergio Elizabeth and Lida Beach (2008)    No More Sleepless Nights by Nilo Irene and Marylu Garza (1996)    Say Mars to Insomnia by Mando Ramirez (2009)    The Insomnia Workbook by Nicole Garcia and Norm Eli (2009)    The Insomnia Answer by Jl Kaur and Logan Hoyos (2006)  You have symptoms of insomnia and would likely benefit from non-medication approaches to treatment.  Cognitive behavioral treatment (CBT) for insomnia is a very effective technique that involves changing your behaviors and thoughts associated with sleep.  People that are motivated to make changes in their sleep pattern are likely to benefit from internet based cognitive behavioral treatment for insomnia.  Internet CBT programs include but are not limited to www.SHUTi.me or www.SleepIO.com.  There is a fee for using these programs that is similar to actually seeing an insomnia expert.  By entering the code  [place]  it will allow us to know  if our patients find these services useful.      Online Programs     www.SHUTi.me (pronounced shut eye). There is a fee for this program.       www.sleepIO.com (pronounced sleep ee oh). There is a fee for this program. Enter the code  [place]  if you decide to enroll in this program.       Your BMI is Body mass index is 22.82 kg/(m^2).  Weight management is a personal decision.  If you are interested in exploring weight loss strategies, the following discussion covers the approaches that may be successful. Body mass index (BMI) is one way to tell whether you are at a healthy weight, overweight, or obese. It measures your weight in relation to your height.  A BMI of 18.5 to 24.9 is in the healthy range. A person with a BMI of 25 to 29.9 is considered overweight, and someone with a BMI of 30 or greater is considered obese. More than two-thirds of American adults are considered overweight or obese.  Being overweight or obese increases the risk for further weight gain. Excess weight may lead to heart disease and diabetes.  Creating and following plans for healthy eating and physical activity may help you improve your health.  Weight control is part of healthy lifestyle and includes exercise, emotional health, and healthy eating habits. Careful eating habits lifelong are the mainstay of weight control. Though there are significant health benefits from weight loss, long-term weight loss with diet alone may be very difficult to achieve- studies show long-term success with dietary management in less than 10% of people. Attaining a healthy weight may be especially difficult to achieve in those with severe obesity. In some cases, medications, devices and surgical management might be considered.  What can you do?  If you are overweight or obese and are interested in methods for weight loss, you should discuss this with your provider.     Consider reducing daily calorie intake by 500 calories.     Keep a food journal.      Avoiding skipping meals, consider cutting portions instead.    Diet combined with exercise helps maintain muscle while optimizing fat loss. Strength training is particularly important for building and maintaining muscle mass. Exercise helps reduce stress, increase energy, and improves fitness. Increasing exercise without diet control, however, may not burn enough calories to loose weight.       Start walking three days a week 10-20 minutes at a time    Work towards walking thirty minutes five days a week     Eventually, increase the speed of your walking for 1-2 minutes at time    In addition, we recommend that you review healthy lifestyles and methods for weight loss available through the National Institutes of Health patient information sites:  http://win.niddk.nih.gov/publications/index.htm    And look into health and wellness programs that may be available through your health insurance provider, employer, local community center, or maureen club.

## 2018-03-23 NOTE — PROGRESS NOTES
Visit Date:   03/23/2018      DATE OF SERVICE: 03/23/2018.      CHIEF COMPLAINT:  Difficulty staying asleep.      HISTORY OF PRESENT ILLNESS:  Mrs. Lopez is a 57-year-old female who presents with a chief complaint of difficulty staying asleep.  The patient reports that she has had sleep difficulties since the onset of menopause more than 6 years ago.  Her insomnia has worsened over the last 3 years.  Her difficulty is with sleep maintenance.  She wakes up for uncertain reasons in the middle of the night and can stay up for 1-3 hours feeling frustrated.      The patient goes to bed at around 9:30 p.m.  She does not have difficulty initiating sleep at this time.  After about 3 hours into the night, she wakes up spontaneously and will have difficulty returning to sleep.  There are no clear precipitating factors for her arousal.  When she wakes up, she has a look at her phone or continues to lie in bed trying to force sleep.  If it has taken for more than an hour, she will then apply some relaxing oil on her feet which apparently helps.  When she gets back into sleep, she sleeps until around 6-630 a.m.  The patient complains of some tiredness during the daytime but denies any excessive sleepiness.  She spends her daytime taking care of her 3-month-old grandchild.  Her [place] Sleepiness Scale Score is 2/24 indicating no daytime sleepiness.      The patient and her  sleep separately.  There is report of mild intermittent snoring.  She denies having snort arousals, gasping or choking episodes in her sleep.  She does not have witnessed apneas.      The patient denies having restless legs symptoms.  There is no history of dream enactment behavior or other parasomnias.      There is a previous history of anxiety related to menopause.  She was briefly on sertraline which has now been discontinued.  She denies any depressive symptoms.      The patient does not drink any coffee or any caffeinated drinks.  She rarely  uses alcohol.      She has taken an over-the-counter Unisom on 2 nights which apparently was helpful.  She has not been on any other pharmacotherapy for insomnia.      PAST MEDICAL HISTORY:   1.  Tachycardia.   2.  Anxiety disorder.      FAMILY HISTORY:  No family history of sleep disorders.      SOCIAL HISTORY:  She is unemployed.  There is no history of smoking.      REVIEW OF SYSTEMS:  A complete review of systems was negative except for postnasal drip.      PHYSICAL EXAMINATION:   CONSTITUTIONAL:  Awake, alert, cooperative, in no apparent distress.   Eyes no icterus, normal conjunctivae, STACIA.     ENT:  Oropharynx is Stevens class II.   CARDIOVASCULAR:  Regular S1 and S2.   PULMONARY:  Chest symmetric.  Lungs clear bilaterally.   NEUROLOGIC:  Alert and oriented x3, no focal neurological deficit.  Cranial nerves are grossly normal.   PSYCHIATRIC:  Mood is anxious with a congruent affect.  Attention and concentration is normal.      ASSESSMENT:  Chronic psychophysiologic insomnia.      The patient's history and progression of symptoms is characteristic of chronic psychophysiologic insomnia manifesting with sleep maintenance difficulty and middle insomnia.  There are no significant medical or psychiatric comorbidities.  The patient has mild tiredness in the daytime is a consequence of her insomnia.  History is negative for symptoms of other primary sleep disorders.  There is a history of mild intermittent snoring but overall risk for sleep apnea is low.      I do not think further testing is indicated.  I recommended cognitive behavioral therapy for management of her insomnia.  I recommended consultation with Dr. Aguiar, the behavioral sleep specialist for further insomnia management.      Meanwhile, I emphasized the following changes to address her insomnia.  I recommended keeping a consistent wake time in the morning.  I recommended postponing bedtime by an hour to increase her homeostatic sleep drive.  We  discussed the regular practice of relaxation techniques, mainly deep breathing exercises and visual imagery and making this a bedtime routine.  I have given her resources regarding books dealing with insomnia as well as Internet CBT I resources.      TREATMENT PLAN:   A referral to Dr. Aguiar, behavioral sleep specialist for cognitive behavioral therapy management of insomnia.      I spent a total of 45 minutes with this patient, with more than 50% spent in counseling.         CAITLYN ARAUJO MD             D: 2018   T: 2018   MT: MD      Name:     PATRICK HESS   MRN:      [id number]        Account:      [id number]   :      1960           Visit Date:   2018      Document: [id number]       cc: Cuca Wyatt MD

## 2018-03-23 NOTE — MR AVS SNAPSHOT
After Visit Summary   3/23/2018    Skyla Lopez    MRN: [id number]           Patient Information     Date Of Birth          1960        Visit Information        Provider Department      3/23/2018 12:30 PM Deniz Kat MD [facility]        Today's Diagnoses     Psychophysiologic insomnia    -  1      Care Instructions    1. Insomnia     - Follow the following behavioral changes to manage insomnia   - Postpone your bedtime by an hour   - Practice relaxation techniques at bedtime. Do 20 deep breathing exercises and after this visual imagery( imagining yourself in a calm and peaceful place and immersing yourself totally in this situation)  - keep you wake time consistent in the morning   - Consultation with Dr. Aguiar, behavioral sleep specialist    You have symptoms of insomnia and would likely benefit from non-medication approaches to treatment.  Cognitive behavioral treatment (CBT) for insomnia is a very effective technique that involves changing your behaviors and thoughts associated with sleep.  People that are motivated to make changes in their sleep pattern are likely to benefit from self-help strategies for treatment of insomnia.  Several treatment books for insomnia are listed on our patient treatment resources.      Suggested Resources  Insomnia Treatment Books     Overcoming Insomnia by Sergio Elizabeth and Lida Beach (2008)    No More Sleepless Nights by Nilo Irene and Marylu Garza (1996)    Say Mars to Insomnia by Mando Ramirez (2009)    The Insomnia Workbook by Nicole Garcia and Norm Eli (2009)    The Insomnia Answer by Jl Kaur and Logan Hoyos (2006)  You have symptoms of insomnia and would likely benefit from non-medication approaches to treatment.  Cognitive behavioral treatment (CBT) for insomnia is a very effective technique that involves changing your behaviors and thoughts associated with sleep.  People that are  motivated to make changes in their sleep pattern are likely to benefit from internet based cognitive behavioral treatment for insomnia.  Internet CBT programs include but are not limited to www.[employer] or www.[employer].  There is a fee for using these programs that is similar to actually seeing an insomnia expert.  By entering the code  [facility]  it will allow us to know if our patients find these services useful.      Online Programs     www.[employer] (pronounced shut eye). There is a fee for this program.       www.sleepIO.com (pronounced sleep ee oh). There is a fee for this program. Enter the code  [facility]  if you decide to enroll in this program.       Your BMI is Body mass index is 22.82 kg/(m^2).  Weight management is a personal decision.  If you are interested in exploring weight loss strategies, the following discussion covers the approaches that may be successful. Body mass index (BMI) is one way to tell whether you are at a healthy weight, overweight, or obese. It measures your weight in relation to your height.  A BMI of 18.5 to 24.9 is in the healthy range. A person with a BMI of 25 to 29.9 is considered overweight, and someone with a BMI of 30 or greater is considered obese. More than two-thirds of American adults are considered overweight or obese.  Being overweight or obese increases the risk for further weight gain. Excess weight may lead to heart disease and diabetes.  Creating and following plans for healthy eating and physical activity may help you improve your health.  Weight control is part of healthy lifestyle and includes exercise, emotional health, and healthy eating habits. Careful eating habits lifelong are the mainstay of weight control. Though there are significant health benefits from weight loss, long-term weight loss with diet alone may be very difficult to achieve- studies show long-term success with dietary management in less than 10% of people. Attaining a healthy weight may be  especially difficult to achieve in those with severe obesity. In some cases, medications, devices and surgical management might be considered.  What can you do?  If you are overweight or obese and are interested in methods for weight loss, you should discuss this with your provider.     Consider reducing daily calorie intake by 500 calories.     Keep a food journal.     Avoiding skipping meals, consider cutting portions instead.    Diet combined with exercise helps maintain muscle while optimizing fat loss. Strength training is particularly important for building and maintaining muscle mass. Exercise helps reduce stress, increase energy, and improves fitness. Increasing exercise without diet control, however, may not burn enough calories to loose weight.       Start walking three days a week 10-20 minutes at a time    Work towards walking thirty minutes five days a week     Eventually, increase the speed of your walking for 1-2 minutes at time    In addition, we recommend that you review healthy lifestyles and methods for weight loss available through the National Institutes of Health patient information sites:  http://win.niddk.nih.gov/publications/index.htm    And look into health and wellness programs that may be available through your health insurance provider, employer, local community center, or maureen club.                Follow-ups after your visit        Additional Services     SLEEP PSYCHOLOGY REFERRAL       Referral Urgency: Next available    Dr. Darshan Aguiar is at the following clinics on the following days:  [facility] - [address] [place] [address]        Thursday and Friday (PLEASE CALL [phone] to schedule an appointment).  [facility] - [address] [place], [place]       Wednesdays   (PLEASE CALL [phone] to schedule an appointment).     Please be aware that coverage of these services is subject to the terms and limitations of your health insurance plan. Call  "member services at your health plan with any benefit or coverage questions.     Please bring the following to your appointment:  >> List of current medications   >> This referral request   >> Any documents/labs given to you for this referral                  Your next 10 appointments already scheduled     Apr 02, 2018 10:10 AM CDT   (Arrive by 9:55 AM)   MA SCREENING BILATERAL W/ ROYAL with [id number]   [facility] ([facility])    [address], [address]  [facility] [id number] [phone]           Three-dimensional (3D) mammograms are available at [facility] locations in [facility], [place], [place], [facility], [place], [place], and [place]. [facility] locations include [place] and [facility] [place] [facility] in [place]. Benefits of 3D mammograms include: - Improved rate of cancer detection - Decreases your chance of having to go back for more tests, which means fewer: - \"False-positive\" results (This means that there is an abnormal area but it isn't cancer.) - Invasive testing procedures, such as a biopsy or surgery - Can provide clearer images of the breast if you have dense breast tissue. 3D mammography is an optional exam that anyone can have with a 2D mammogram. It doesn't replace or take the place of a 2D mammogram. 2D mammograms remain an effective screening test for all women.  Not all insurance companies cover the cost of a 3D mammogram. Check with your insurance.            Jun 12, 2018 10:30 AM CDT   New Sleep Patient with Darshan Aguiar PsyD   [facility] ([facility])    [address]  [address]  [facility] [id number] [phone]              Who to contact     If you have questions or need follow up information about today's clinic visit or your schedule please contact [facility] directly at [phone].  Normal or non-critical lab and imaging results will be " "communicated to you by MyChart, letter or phone within 4 business days after the clinic has received the results. If you do not hear from us within 7 days, please contact the clinic through [employer] or phone. If you have a critical or abnormal lab result, we will notify you by phone as soon as possible.  Submit refill requests through [employer] or call your pharmacy and they will forward the refill request to us. Please allow 3 business days for your refill to be completed.          Additional Information About Your Visit        [employer] Information     [employer] gives you secure access to your electronic health record. If you see a primary care provider, you can also send messages to your care team and make appointments. If you have questions, please call your primary care clinic.  If you do not have a primary care provider, please call [phone] and they will assist you.        Care EveryWhere ID     This is your Care EveryWhere ID. This could be used by other organizations to access your [facility] medical records  [id number]        Your Vitals Were     Pulse Respirations Height Pulse Oximetry BMI (Body Mass Index)       65 16 1.854 m (6' 1\") 100% 22.82 kg/m2        Blood Pressure from Last 3 Encounters:   03/23/18 121/74   08/24/17 116/80   04/17/17 118/80    Weight from Last 3 Encounters:   03/23/18 78.5 kg (173 lb)   08/24/17 78 kg (172 lb)   04/17/17 79.5 kg (175 lb 3.2 oz)              We Performed the Following     SLEEP PSYCHOLOGY REFERRAL        Primary Care Provider Office Phone # Fax #    Cuca Wyatt [phone] [address] [address]        Equal Access to Services     [facility] : Hadii yuni bolanos Sobehzad, waaxda luqadaha, qaybta kaalmada abel, frank dickson . So [facility] [phone].    ATENCIÓN: Si habla español, tiene a cano disposición servicios gratuitos de asistencia lingüística. Llame al [phone].    We " comply with applicable federal civil rights laws and [place] laws. We do not discriminate on the basis of race, color, national origin, age, disability, sex, sexual orientation, or gender identity.            Thank you!     Thank you for choosing [facility]  for your care. Our goal is always to provide you with excellent care. Hearing back from our patients is one way we can continue to improve our services. Please take a few minutes to complete the written survey that you may receive in the mail after your visit with us. Thank you!             Your Updated Medication List - Protect others around you: Learn how to safely use, store and throw away your medicines at www.disposemymeds.org.          This list is accurate as of 3/23/18  1:29 PM.  Always use your most recent med list.                   Brand Name Dispense Instructions for use Diagnosis    atenolol 25 MG tablet    TENORMIN    90 tablet    Take 1 tablet (25 mg) by mouth daily    Tachycardia       metoprolol succinate 25 MG 24 hr tablet    TOPROL-XL    90 tablet    Take 1 tablet (25 mg) by mouth daily    Tachycardia

## 2018-04-25 ENCOUNTER — HOSPITAL ENCOUNTER (OUTPATIENT)
Dept: MAMMOGRAPHY | Facility: CLINIC | Age: 58
Discharge: HOME | End: 2018-04-25
Attending: OBSTETRICS & GYNECOLOGY | Admitting: OBSTETRICS & GYNECOLOGY
Payer: COMMERCIAL

## 2018-04-25 DIAGNOSIS — Z12.31 VISIT FOR SCREENING MAMMOGRAM: ICD-10-CM

## 2018-04-25 PROCEDURE — 77067 SCR MAMMO BI INCL CAD: CPT

## 2018-08-15 DIAGNOSIS — R00.0 TACHYCARDIA: ICD-10-CM

## 2018-08-15 RX ORDER — ATENOLOL 25 MG/1
25 TABLET ORAL DAILY
Qty: 30 TABLET | Refills: 0 | COMMUNITY
Start: 2018-08-15 | End: 2018-08-30

## 2018-08-15 RX ORDER — ATENOLOL 25 MG/1
TABLET ORAL
Qty: 90 TABLET | Refills: 3 | OUTPATIENT
Start: 2018-08-15

## 2018-08-15 NOTE — TELEPHONE ENCOUNTER
[facility]  atenolol (TENORMIN) 25 MG tablet  30 days\  Called pt and let them know due for a ov  [place]  [phone] (home)

## 2018-08-30 ENCOUNTER — OFFICE VISIT (OUTPATIENT)
Dept: FAMILY MEDICINE | Facility: CLINIC | Age: 58
End: 2018-08-30

## 2018-08-30 VITALS
WEIGHT: 170 LBS | HEART RATE: 63 BPM | TEMPERATURE: 98.1 F | DIASTOLIC BLOOD PRESSURE: 78 MMHG | BODY MASS INDEX: 22.43 KG/M2 | OXYGEN SATURATION: 97 % | SYSTOLIC BLOOD PRESSURE: 118 MMHG

## 2018-08-30 DIAGNOSIS — G44.201 ACUTE INTRACTABLE TENSION-TYPE HEADACHE: Primary | ICD-10-CM

## 2018-08-30 DIAGNOSIS — Z13.9 SCREENING FOR CONDITION: ICD-10-CM

## 2018-08-30 DIAGNOSIS — E78.5 HYPERLIPIDEMIA LDL GOAL <130: ICD-10-CM

## 2018-08-30 DIAGNOSIS — F41.1 GENERALIZED ANXIETY DISORDER: ICD-10-CM

## 2018-08-30 DIAGNOSIS — R00.2 PALPITATIONS: ICD-10-CM

## 2018-08-30 DIAGNOSIS — Z79.899 MEDICATION MANAGEMENT: ICD-10-CM

## 2018-08-30 PROCEDURE — 84460 ALANINE AMINO (ALT) (SGPT): CPT | Mod: 90 | Performed by: FAMILY MEDICINE

## 2018-08-30 PROCEDURE — 80061 LIPID PANEL: CPT | Mod: 90 | Performed by: FAMILY MEDICINE

## 2018-08-30 PROCEDURE — 84443 ASSAY THYROID STIM HORMONE: CPT | Mod: 90 | Performed by: FAMILY MEDICINE

## 2018-08-30 PROCEDURE — 80048 BASIC METABOLIC PNL TOTAL CA: CPT | Mod: 90 | Performed by: FAMILY MEDICINE

## 2018-08-30 PROCEDURE — 84439 ASSAY OF FREE THYROXINE: CPT | Mod: 90 | Performed by: FAMILY MEDICINE

## 2018-08-30 PROCEDURE — 36415 COLL VENOUS BLD VENIPUNCTURE: CPT | Performed by: FAMILY MEDICINE

## 2018-08-30 PROCEDURE — 83735 ASSAY OF MAGNESIUM: CPT | Mod: 90 | Performed by: FAMILY MEDICINE

## 2018-08-30 PROCEDURE — 82306 VITAMIN D 25 HYDROXY: CPT | Mod: 90 | Performed by: FAMILY MEDICINE

## 2018-08-30 PROCEDURE — 84481 FREE ASSAY (FT-3): CPT | Mod: 90 | Performed by: FAMILY MEDICINE

## 2018-08-30 PROCEDURE — 99214 OFFICE O/P EST MOD 30 MIN: CPT | Performed by: FAMILY MEDICINE

## 2018-08-30 RX ORDER — CHOLECALCIFEROL (VITAMIN D3) 125 MCG
CAPSULE ORAL
COMMUNITY

## 2018-08-30 RX ORDER — CHLORAL HYDRATE 500 MG
2 CAPSULE ORAL DAILY
COMMUNITY
End: 2024-06-10

## 2018-08-30 RX ORDER — ATENOLOL 25 MG/1
25 TABLET ORAL DAILY
Qty: 90 TABLET | Refills: 3 | Status: SHIPPED | OUTPATIENT
Start: 2018-08-30 | End: 2019-09-05

## 2018-08-30 RX ORDER — SERTRALINE HYDROCHLORIDE 25 MG/1
75 TABLET, FILM COATED ORAL
COMMUNITY
Start: 2018-08-20 | End: 2021-12-30

## 2018-08-30 RX ORDER — CLONAZEPAM 0.5 MG/1
0.25 TABLET ORAL AT BEDTIME
COMMUNITY
Start: 2018-07-23 | End: 2019-10-07

## 2018-08-30 RX ORDER — FAMOTIDINE 20 MG
TABLET ORAL
COMMUNITY

## 2018-08-30 ASSESSMENT — ANXIETY QUESTIONNAIRES
GAD7 TOTAL SCORE: 2
1. FEELING NERVOUS, ANXIOUS, OR ON EDGE: SEVERAL DAYS
3. WORRYING TOO MUCH ABOUT DIFFERENT THINGS: NOT AT ALL
5. BEING SO RESTLESS THAT IT IS HARD TO SIT STILL: NOT AT ALL
IF YOU CHECKED OFF ANY PROBLEMS ON THIS QUESTIONNAIRE, HOW DIFFICULT HAVE THESE PROBLEMS MADE IT FOR YOU TO DO YOUR WORK, TAKE CARE OF THINGS AT HOME, OR GET ALONG WITH OTHER PEOPLE: NOT DIFFICULT AT ALL
2. NOT BEING ABLE TO STOP OR CONTROL WORRYING: NOT AT ALL
6. BECOMING EASILY ANNOYED OR IRRITABLE: NOT AT ALL
7. FEELING AFRAID AS IF SOMETHING AWFUL MIGHT HAPPEN: NOT AT ALL

## 2018-08-30 ASSESSMENT — PATIENT HEALTH QUESTIONNAIRE - PHQ9: 5. POOR APPETITE OR OVEREATING: SEVERAL DAYS

## 2018-08-30 NOTE — PROGRESS NOTES
"SUBJECTIVE:  57 year old female presents for refill of atenolol, prescribed for symptomatic palpitations  She denies symptoms of rapid heart beat- she is noticing an irregular heart beat- consistent with PVC's (feels at night before falling sleep- regular heart beats with an irregular beat interspaced    Routine exams with Dr Prakash  Previous treatment of anxiety (started with onset of menopause)  Has been off of HRT, sertraline for years  This spring, felt anxious again- saw Dr Neal, he restarted sertraline and .25 klonopin    Her daughter is getting  in five weeks- she doesn't feel \"anxious\"  Situational stress, sister in law stage 4 lung cancer, another sister with anxiety    Symptoms of headache, head \"pressure\"- she feels jittery     Other problems: chronic insomnia  Consult with sleep medicine this past March:referral to behavioral sleep specialist    Two to three days a week, she watches her 9 month grandson- purposeful exercise  She does not participate in a scheduled exercise program.    Patient Active Problem List   Diagnosis     Generalized anxiety disorder     Mitral valve insufficiency and aortic valve insufficiency     Health Care Home     Panic disorder with agoraphobia and moderate panic attacks     Tachycardia     Plantar fasciitis     ACP (advance care planning)     High serum vitamin B12     Endometrial polyp     Past Surgical History:   Procedure Laterality Date     APPENDECTOMY       CRYOPRESERVATION REPRODUCTIVE TISSUE, OVARIAN  1982    left ovary removed/borderline tumor     DILATION AND CURETTAGE, OPERATIVE HYSTEROSCOPY WITH MORCELLATOR, COMBINED N/A 6/4/2015    Procedure: COMBINED DILATION AND CURETTAGE, OPERATIVE HYSTEROSCOPY WITH MORCELLATOR;  Surgeon: Hamida Prakash MD;  Location: [facility] REMOVAL OF TONSILS,12+ Y/O  age 23    Tonsils 13+y.o.     LAPAROSCOPIC FUNDOPLASTY  1990/92/93    endometriosis     Current Outpatient Prescriptions   Medication     atenolol " (TENORMIN) 25 MG tablet     clonazePAM (KLONOPIN) 0.5 MG tablet     fish oil-omega-3 fatty acids 1000 MG capsule     Lactobacillus (PROBIOTIC ACIDOPHILUS) CAPS     Multiple Vitamins-Minerals (MULTIVITAMIN ADULT PO)     Plant Sterols and Stanols (CHOLESTOFF PO)     sertraline (ZOLOFT) 25 MG tablet     Vitamin D, Cholecalciferol, 1000 units CAPS     No current facility-administered medications for this visit.    Vitamin D taking 6482-7996 IU daily    She has been attempting to taper from Klonopin  I advised her to continue the dose recommended by her psychiatrist- at least until after her daughter's wedding.     OBJECTIVE:  /78 (BP Location: Left arm, Patient Position: Sitting, Cuff Size: Adult Regular)  Pulse 63  Temp 98.1  F (36.7  C) (Oral)  Wt 77.1 kg (170 lb)  SpO2 97%  BMI 22.43 kg/m2  External ears  and canals clear bilaterally. TM's normal bilaterally. Nose normal without lesions or discharge. Oropharynx normal. Neck supple without palpable adenopathy.  No jaw crepitus with ROM  Regular rate and  rhythm. S1 and S2 normal, no murmurs, clicks, gallops or rubs. No edema or JVD. Chest is clear; no wheezes or rales.  Affect is normal  Myofascial tension across neck and shoulders    Assessment    (G44.201) Acute intractable tension-type headache  (primary encounter diagnosis)  Comment: myofascial tension  Plan: Dental visit recommended: ? TMJ    (F41.1) Generalized anxiety disorder  Comment:   Plan: continue medications as advised by her psychiatrist    (R00.2) Palpitations  Comment:   Plan: VENOUS COLLECTION, ALT (QUEST), BASIC METABOLIC        PANEL (QUEST), atenolol (TENORMIN) 25 MG tablet            (Z79.899) Medication management  Comment:   Plan: VENOUS COLLECTION, ALT (QUEST), BASIC METABOLIC        PANEL (QUEST)            (E78.5) Hyperlipidemia LDL goal <130  Comment:   Plan: Lipid Profile, VENOUS COLLECTION, ALT (QUEST)            (Z13.9) Screening for condition  Comment:   Plan: VENOUS  COLLECTION, Vitamin D deficiency         screening (QUEST), Magnesium (QUEST), TSH         (QUEST), T4 free (Quest), T3, FREE (QUEST)

## 2018-08-30 NOTE — MR AVS SNAPSHOT
After Visit Summary   8/30/2018    Skyla Lopez    MRN: [id number]           Patient Information     Date Of Birth          1960        Visit Information        Provider Department      8/30/2018 8:30 AM Cuca Wyatt MD [facility]        Today's Diagnoses     Acute intractable tension-type headache    -  1    Generalized anxiety disorder        Palpitations        Medication management        Hyperlipidemia LDL goal <130        Screening for condition           Follow-ups after your visit        Who to contact     If you have questions or need follow up information about today's clinic visit or your schedule please contact [facility] directly at [phone].  Normal or non-critical lab and imaging results will be communicated to you by MyChart, letter or phone within 4 business days after the clinic has received the results. If you do not hear from us within 7 days, please contact the clinic through [employer] or phone. If you have a critical or abnormal lab result, we will notify you by phone as soon as possible.  Submit refill requests through [employer] or call your pharmacy and they will forward the refill request to us. Please allow 3 business days for your refill to be completed.          Additional Information About Your Visit        MyChart Information     [employer] gives you secure access to your electronic health record. If you see a primary care provider, you can also send messages to your care team and make appointments. If you have questions, please call your primary care clinic.  If you do not have a primary care provider, please call [phone] and they will assist you.        Care EveryWhere ID     This is your Care EveryWhere ID. This could be used by other organizations to access your [facility] medical records  [id number]        Your Vitals Were     Pulse Temperature Pulse Oximetry BMI (Body Mass Index)          63  98.1  F (36.7  C) (Oral) 97% 22.43 kg/m2         Blood Pressure from Last 3 Encounters:   08/30/18 118/78   03/23/18 121/74   08/24/17 116/80    Weight from Last 3 Encounters:   08/30/18 77.1 kg (170 lb)   03/23/18 78.5 kg (173 lb)   08/24/17 78 kg (172 lb)              We Performed the Following     ALT (QUEST)     BASIC METABOLIC PANEL (QUEST)     Lipid Profile     Magnesium (QUEST)     T3, FREE (QUEST)     T4 free (Quest)     TSH (QUEST)     VENOUS COLLECTION     Vitamin D deficiency screening (QUEST)          Where to get your medicines      These medications were sent to [facility] [address] [place] [address] [address] [facility] [address], [facility] [address]    Hours:  24-hours Phone:  [phone]     atenolol 25 MG tablet          Primary Care Provider Office Phone # Fax #    Cuca Wyatt [phone] [address] [address]        Equal Access to Services     NELSY JAMES AH: Hadii aad ku hadasho Sobehzad, waaxda luqadaha, qaybta kaalmada adeegyada, frank dickson . So [facility] [phone].    ATENCIÓN: Si habla español, tiene a cano disposición servicios gratuitos de asistencia lingüística. LlSelect Medical [facility] [phone].    We comply with applicable federal civil rights laws and [place] laws. We do not discriminate on the basis of race, color, national origin, age, disability, sex, sexual orientation, or gender identity.            Thank you!     Thank you for choosing [facility]  for your care. Our goal is always to provide you with excellent care. Hearing back from our patients is one way we can continue to improve our services. Please take a few minutes to complete the written survey that you may receive in the mail after your visit with us. Thank you!             Your Updated Medication List - Protect others around you: Learn how to safely use, store and throw  away your medicines at www.disposemymeds.org.          This list is accurate as of 8/30/18 11:59 PM.  Always use your most recent med list.                   Brand Name Dispense Instructions for use Diagnosis    atenolol 25 MG tablet    TENORMIN    90 tablet    Take 1 tablet (25 mg) by mouth daily    Palpitations       CHOLESTOFF PO           clonazePAM 0.5 MG tablet    klonoPIN     Take 0.25 mg by mouth At Bedtime        fish oil-omega-3 fatty acids 1000 MG capsule      Take 2 g by mouth daily        MULTIVITAMIN ADULT PO           PROBIOTIC ACIDOPHILUS Caps           sertraline 25 MG tablet    ZOLOFT     75 mg        Vitamin D (Cholecalciferol) 1000 units Caps

## 2018-08-30 NOTE — NURSING NOTE
Jennifer is here for medication check today.    Pre-visit Screening:  Immunizations:  up to date  Colonoscopy:  is up to date  Mammogram: is up to date  Asthma Action Test/Plan:  No concerns  PHQ9:  PHQ-9 given today   GAD7:  NURIA-7 given today  Questioned patient about current smoking habits Pt. has never smoked.  Ok to leave detailed message on voice mail for today's visit only yes, phone # [phone]

## 2018-08-31 LAB
ALT SERPL-CCNC: 22 U/L (ref 6–29)
BUN SERPL-MCNC: 15 MG/DL (ref 7–25)
BUN/CREATININE RATIO: NORMAL (CALC) (ref 6–22)
CALCIUM SERPL-MCNC: 9.3 MG/DL (ref 8.6–10.4)
CHLORIDE SERPLBLD-SCNC: 107 MMOL/L (ref 98–110)
CHOLEST SERPL-MCNC: 211 MG/DL
CHOLEST/HDLC SERPL: 3.6 (CALC)
CO2 SERPL-SCNC: 29 MMOL/L (ref 20–32)
CREAT SERPL-MCNC: 0.8 MG/DL (ref 0.5–1.05)
EGFR AFRICAN AMERICAN - QUEST: 96 ML/MIN/1.73M2
GFR SERPL CREATININE-BSD FRML MDRD: 82 ML/MIN/1.73M2
GLUCOSE - QUEST: 93 MG/DL (ref 65–99)
HDLC SERPL-MCNC: 59 MG/DL
LDLC SERPL CALC-MCNC: 132 MG/DL (CALC)
MAGNESIUM SERPL-MCNC: 2 MG/DL (ref 1.5–2.5)
NONHDLC SERPL-MCNC: 152 MG/DL (CALC)
POTASSIUM SERPL-SCNC: 4.4 MMOL/L (ref 3.5–5.3)
SODIUM SERPL-SCNC: 141 MMOL/L (ref 135–146)
T3FREE SERPL-MCNC: 3.4 PG/ML (ref 2.3–4.2)
T4, FREE, NON-DIALYSIS - QUEST: 1.1 NG/DL (ref 0.8–1.8)
TRIGL SERPL-MCNC: 92 MG/DL
TSH SERPL-ACNC: 1.14 MIU/L (ref 0.4–4.5)
VITAMIN D, 25-OH, TOTAL - QUEST: 49 NG/ML (ref 30–100)

## 2018-08-31 ASSESSMENT — PATIENT HEALTH QUESTIONNAIRE - PHQ9: SUM OF ALL RESPONSES TO PHQ QUESTIONS 1-9: 1

## 2018-08-31 ASSESSMENT — ANXIETY QUESTIONNAIRES: GAD7 TOTAL SCORE: 2

## 2018-12-18 ENCOUNTER — OFFICE VISIT (OUTPATIENT)
Dept: FAMILY MEDICINE | Facility: CLINIC | Age: 58
End: 2018-12-18

## 2018-12-18 VITALS
TEMPERATURE: 98.1 F | HEART RATE: 64 BPM | OXYGEN SATURATION: 98 % | SYSTOLIC BLOOD PRESSURE: 126 MMHG | WEIGHT: 170 LBS | DIASTOLIC BLOOD PRESSURE: 78 MMHG | BODY MASS INDEX: 22.43 KG/M2

## 2018-12-18 DIAGNOSIS — J01.00 ACUTE NON-RECURRENT MAXILLARY SINUSITIS: Primary | ICD-10-CM

## 2018-12-18 LAB
% GRANULOCYTES: 57.8 %
HCT VFR BLD AUTO: 41.2 % (ref 35–47)
HEMOGLOBIN: 13.4 G/DL (ref 11.7–15.7)
LYMPHOCYTES NFR BLD AUTO: 29.8 %
MCH RBC QN AUTO: 30.3 PG (ref 26–33)
MCHC RBC AUTO-ENTMCNC: 32.5 G/DL (ref 31–36)
MCV RBC AUTO: 93.1 FL (ref 78–100)
MONOCYTES NFR BLD AUTO: 12.4 %
PLATELET COUNT - QUEST: 234 10^9/L (ref 150–375)
RBC # BLD AUTO: 4.42 10*12/L (ref 3.8–5.2)
WBC # BLD AUTO: 4.7 10*9/L (ref 4–11)

## 2018-12-18 PROCEDURE — 85025 COMPLETE CBC W/AUTO DIFF WBC: CPT | Performed by: PHYSICIAN ASSISTANT

## 2018-12-18 PROCEDURE — 99213 OFFICE O/P EST LOW 20 MIN: CPT | Performed by: PHYSICIAN ASSISTANT

## 2018-12-18 PROCEDURE — 36415 COLL VENOUS BLD VENIPUNCTURE: CPT | Performed by: PHYSICIAN ASSISTANT

## 2018-12-18 NOTE — PROGRESS NOTES
SUBJECTIVE:   Skyla Lopez is a 58 year old female who presents to clinic today for the following health issues:    Acute Illness   Acute illness concerns: sinus  Onset: Saturday    Fever: no     Chills/Sweats: no     Headache (location?): yes    Sinus Pressure:YES    Conjunctivitis:  no    Ear Pain: YES: right    Rhinorrhea: YES    Congestion: YES    Sore Throat: YES     Cough: no    Wheeze: no     Decreased Appetite: no     Nausea: no     Vomiting: no     Diarrhea:  no     Dysuria/Freq.: no     Fatigue/Achiness: YES    Sick/Strep Exposure: YES     Therapies Tried and outcome:   Advil    Post nasal drainage, sore throat, coughing, watering of eyes  Clear drainage. 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         Problem list and histories reviewed & adjusted, as indicated.  Additional history: as documented    Patient Active Problem List   Diagnosis     Generalized anxiety disorder     Mitral valve insufficiency and aortic valve insufficiency     Health Care Home     Panic disorder with agoraphobia and moderate panic attacks     Tachycardia     Plantar fasciitis     ACP (advance care planning)     High serum vitamin B12     Endometrial polyp     Past Surgical History:   Procedure Laterality Date     APPENDECTOMY       CRYOPRESERVATION REPRODUCTIVE TISSUE, OVARIAN  1982    left ovary removed/borderline tumor     DILATION AND CURETTAGE, OPERATIVE HYSTEROSCOPY WITH MORCELLATOR, COMBINED N/A 6/4/2015    Procedure: COMBINED DILATION AND CURETTAGE, OPERATIVE HYSTEROSCOPY WITH MORCELLATOR;  Surgeon: Hamida Prakash MD;  Location: [facility] REMOVAL OF TONSILS,12+ Y/O  age 23    Tonsils 13+y.o.     LAPAROSCOPIC FUNDOPLASTY  1990/92/93    endometriosis       Social History     Tobacco Use     Smoking status: Never Smoker     Smokeless tobacco: Never Used   Substance Use Topics     Alcohol use: No     Alcohol/week: 0.0 oz     Family History   Problem  Relation Age of Onset     Prostate Cancer Father      Neurologic Disorder Father         Parkinson's     C.A.D. Father         age 66     Lipids Father         high cholesterol/on Lipitor     Breast Cancer Paternal Aunt          Current Outpatient Medications   Medication Sig Dispense Refill     atenolol (TENORMIN) 25 MG tablet Take 1 tablet (25 mg) by mouth daily 90 tablet 3     clonazePAM (KLONOPIN) 0.5 MG tablet Take 0.25 mg by mouth At Bedtime       fish oil-omega-3 fatty acids 1000 MG capsule Take 2 g by mouth daily       Lactobacillus (PROBIOTIC ACIDOPHILUS) CAPS        Multiple Vitamins-Minerals (MULTIVITAMIN ADULT PO)        Plant Sterols and Stanols (CHOLESTOFF PO)        sertraline (ZOLOFT) 25 MG tablet 75 mg       Vitamin D, Cholecalciferol, 1000 units CAPS        azithromycin (ZITHROMAX) 250 MG tablet Two tablets first day, then one tablet daily for four days. 6 tablet 0     Allergies   Allergen Reactions     Celexa [Citalopram] GI Disturbance     Metoclopramide Hcl      Reglan-SHAKING     Penicillin G Rash     BP Readings from Last 3 Encounters:   12/18/18 126/78   08/30/18 118/78   03/23/18 121/74    Wt Readings from Last 3 Encounters:   12/18/18 77.1 kg (170 lb)   08/30/18 77.1 kg (170 lb)   03/23/18 78.5 kg (173 lb)                    Reviewed and updated as needed this visit by clinical staff  Tobacco  Allergies  Meds  Problems       Reviewed and updated as needed this visit by Provider         ROS:  Constitutional, HEENT, cardiovascular, pulmonary, gi and gu systems are negative, except as otherwise noted.    OBJECTIVE:     /78 (BP Location: Left arm, Patient Position: Sitting, Cuff Size: Adult Large)   Pulse 64   Temp 98.1  F (36.7  C) (Oral)   Wt 77.1 kg (170 lb)   SpO2 98%   BMI 22.43 kg/m    Body mass index is 22.43 kg/m .     GENERAL: healthy, alert and no distress  EYES: Eyes grossly normal to inspection, PERRL and conjunctivae and sclerae normal  HENT: ear canals and TM's  normal, nose and mouth without ulcers or lesions  NECK: no adenopathy, no asymmetry, masses, or scars and thyroid normal to palpation  RESP: lungs clear to auscultation - no rales, rhonchi or wheezes  CV: regular rates and rhythm, normal S1 S2, no S3 or S4 and no murmur, click or rub  MS: no gross musculoskeletal defects noted, no edema  SKIN: no suspicious lesions or rashes  NEURO: Normal strength and tone, mentation intact and speech normal  PSYCH: mentation appears normal, affect normal/bright        ASSESSMENT/PLAN:     (J01.00) Acute non-recurrent maxillary sinusitis  (primary encounter diagnosis)  Comment: new  Plan: CL AFF HEMOGRAM/PLATE/DIFF (BFP), VENOUS         COLLECTION      Symptomatic treatment consisting of increased fluids, oral and nasal decongestants if tolerated, Vicks rub, OTC analgesia,  Netti Pot if copious drainage and relative rest.      Prescription medications: none today- not indicated - call next week if sx persist or if fever> 101    The patient is instructed to call if symptoms not improving, worsen, or fail to resolve after antibiotic treatment if antibiotics were prescribed.          PAULINE Bain  [facility]

## 2018-12-18 NOTE — NURSING NOTE
Jennifer is here today for a URI.    Pre-visit Screening:  Immunizations:  up to date  Colonoscopy:  is up to date  Mammogram: is up to date  Asthma Action Test/Plan:  ANTHONY  PHQ9:  NA  GAD7:  NA  Questioned patient about current smoking habits Pt. has never smoked.  Ok to leave detailed message on voice mail for today's visit only Yes, phone # [phone]

## 2018-12-21 ENCOUNTER — TELEPHONE (OUTPATIENT)
Dept: FAMILY MEDICINE | Facility: CLINIC | Age: 58
End: 2018-12-21

## 2018-12-21 DIAGNOSIS — J01.00 ACUTE NON-RECURRENT MAXILLARY SINUSITIS: Primary | ICD-10-CM

## 2018-12-21 RX ORDER — AZITHROMYCIN 250 MG/1
TABLET, FILM COATED ORAL
Qty: 6 TABLET | Refills: 0 | Status: SHIPPED | OUTPATIENT
Start: 2018-12-21 | End: 2019-10-07

## 2018-12-21 NOTE — TELEPHONE ENCOUNTER
Skyla Amber Ericson called the clinic support line with the following:    Pt states that she is not doing any better and we are coming into a long weekend with the holiday. She is wondering if you can call her back. She is concerned that she is going to get worse, not better     [phone]    Can send to nurse pool

## 2019-06-11 ENCOUNTER — HOSPITAL ENCOUNTER (OUTPATIENT)
Dept: MAMMOGRAPHY | Facility: CLINIC | Age: 59
Discharge: HOME | End: 2019-06-11
Attending: OBSTETRICS & GYNECOLOGY | Admitting: OBSTETRICS & GYNECOLOGY
Payer: COMMERCIAL

## 2019-06-11 DIAGNOSIS — Z12.31 SCREENING MAMMOGRAM, ENCOUNTER FOR: ICD-10-CM

## 2019-06-11 PROCEDURE — 77063 BREAST TOMOSYNTHESIS BI: CPT

## 2019-09-05 DIAGNOSIS — R00.2 PALPITATIONS: ICD-10-CM

## 2019-09-05 RX ORDER — ATENOLOL 25 MG/1
TABLET ORAL
Qty: 30 TABLET | Refills: 0 | COMMUNITY
Start: 2019-09-05 | End: 2019-10-07

## 2019-09-05 NOTE — TELEPHONE ENCOUNTER
[facility]  30 days called in  atenolol (TENORMIN) 25 MG tablet  Pt due for a yearly fasting ov  Rosie  [phone]

## 2019-10-07 ENCOUNTER — OFFICE VISIT (OUTPATIENT)
Dept: FAMILY MEDICINE | Facility: CLINIC | Age: 59
End: 2019-10-07

## 2019-10-07 VITALS
WEIGHT: 177 LBS | BODY MASS INDEX: 23.35 KG/M2 | TEMPERATURE: 98.2 F | DIASTOLIC BLOOD PRESSURE: 62 MMHG | HEART RATE: 77 BPM | OXYGEN SATURATION: 98 % | SYSTOLIC BLOOD PRESSURE: 112 MMHG

## 2019-10-07 DIAGNOSIS — Z79.899 MEDICATION MANAGEMENT: ICD-10-CM

## 2019-10-07 DIAGNOSIS — E78.5 HYPERLIPIDEMIA LDL GOAL <130: ICD-10-CM

## 2019-10-07 DIAGNOSIS — R00.2 PALPITATIONS: ICD-10-CM

## 2019-10-07 PROCEDURE — 99213 OFFICE O/P EST LOW 20 MIN: CPT | Performed by: FAMILY MEDICINE

## 2019-10-07 RX ORDER — ATENOLOL 25 MG/1
25 TABLET ORAL DAILY
Qty: 90 TABLET | Refills: 3 | Status: SHIPPED | OUTPATIENT
Start: 2019-10-07 | End: 2020-09-18

## 2019-10-07 RX ORDER — MULTIVIT-MIN/IRON/FOLIC ACID/K 18-600-40
CAPSULE ORAL
COMMUNITY

## 2019-10-07 NOTE — PROGRESS NOTES
Subjective     Skyla Lopez is a 58 year old female who presents to clinic today for the following health issues:    Refill of metoprolol- treatment for palpitations  Last EKG 2016  Echo 2014  She denies symptoms of rapid heart beat- irregular heart beat, dizzyness  No apparent side effects from atenolol- normal exercise tolerance- walks    Anxiety: under the care of Dr Neal   GYN -former patient of Dr Prakash (sertraline started with onset of menopause)  Off for 7-8 months- restarted and continues on a low dose  Still taking vitamin D3 1000 IU daily        Health Care Maintenance  Has not done colonoscopy- concerned about the prep  No family history of colon cancer  No change in bowel habits, abdominal pain or blood in stools  She will check with her insurance regarding payment for cologuard and contact me if she wishes this ordered    Current Outpatient Medications   Medication     Ascorbic Acid (VITAMIN C) 500 MG CAPS     atenolol (TENORMIN) 25 MG tablet     fish oil-omega-3 fatty acids 1000 MG capsule     Lactobacillus (PROBIOTIC ACIDOPHILUS) CAPS     Multiple Vitamins-Minerals (MULTIVITAMIN ADULT PO)     Plant Sterols and Stanols (CHOLESTOFF PO)     sertraline (ZOLOFT) 25 MG tablet     Vitamin D, Cholecalciferol, 1000 units CAPS     No current facility-administered medications for this visit.      Supplements:  Centrum heart  cholestoff- at [employer]  Vitamin D  Fish oil  Vitamin C 500 mg  probiotic      How many days per week do you miss taking your medication? 0         Patient Active Problem List   Diagnosis     Generalized anxiety disorder     Mitral valve insufficiency and aortic valve insufficiency     Health Care Home     Panic disorder with agoraphobia and moderate panic attacks     Tachycardia     Plantar fasciitis     ACP (advance care planning)     High serum vitamin B12     Endometrial polyp     Past Surgical History:   Procedure Laterality Date     APPENDECTOMY       CRYOPRESERVATION  REPRODUCTIVE TISSUE, OVARIAN  1982    left ovary removed/borderline tumor     DILATION AND CURETTAGE, OPERATIVE HYSTEROSCOPY WITH MORCELLATOR, COMBINED N/A 6/4/2015    Procedure: COMBINED DILATION AND CURETTAGE, OPERATIVE HYSTEROSCOPY WITH MORCELLATOR;  Surgeon: Hamida Prakash MD;  Location: [facility] REMOVAL OF TONSILS,12+ Y/O  age 23    Tonsils 13+y.o.     LAPAROSCOPIC FUNDOPLASTY  1990/92/93    endometriosis       Social History     Tobacco Use     Smoking status: Never Smoker     Smokeless tobacco: Never Used   Substance Use Topics     Alcohol use: No     Alcohol/week: 0.0 standard drinks     Family History   Problem Relation Age of Onset     Prostate Cancer Father      Neurologic Disorder Father         Parkinson's     C.A.D. Father         age 66     Lipids Father         high cholesterol/on Lipitor     Breast Cancer Paternal Aunt            PROBLEMS reviewed and updated as needed this visit by Provider         Review of Systems   ROS COMP: Constitutional, HEENT, cardiovascular, pulmonary, gi and gu systems are negative, except as otherwise noted.  Gaining weight- less than 5 pounds  Anxiety controlled    Family history:  Mother has atrial fib  Sisters have tachycardia      Objective    /62 (BP Location: Right arm, Patient Position: Sitting, Cuff Size: Adult Large)   Pulse 77   Temp 98.2  F (36.8  C) (Oral)   Wt 80.3 kg (177 lb)   SpO2 98%   BMI 23.35 kg/m    There is no height or weight on file to calculate BMI.  Physical Exam   Pulse is regular  Regular rate and  rhythm. S1 and S2 normal, no murmurs, clicks, gallops or rubs. No edema or JVD. Chest is clear; no wheezes or rales.  EKG discussed, deferred based on normal exam and no symptoms of tachycardia    Diagnostic Test Results:  Labs reviewed in Epic  No results found for this or any previous visit (from the past 24 hour(s)).        Assessment & Plan   (R00.2) Palpitations  Comment:    Plan: atenolol (TENORMIN) 25 MG tablet, TSH with  free        T4 reflex (QUEST), Comprehensive metabolic         panel, VENOUS COLLECTION             (Z79.899) Medication management  Comment:    Plan: Comprehensive metabolic panel, VENOUS         COLLECTION            (E78.5) Hyperlipidemia LDL goal <130  Comment:    Plan: VENOUS COLLECTION, Lipid Panel (BFP)                 FUTURE LABS:orders in epic- she will return for fasting labs   Recheck in one year for refill of atenolol  Colon cancer screening encouraged  Cuca Wyatt MD  [facility]

## 2019-10-07 NOTE — NURSING NOTE
Chief Complaint   Patient presents with     Recheck Medication     not fasting, refill     Pre-visit Screening:  Immunizations:  up to date  Colonoscopy:  needs to do, declined  Mammogram: is up to date  Asthma Action Test/Plan:  ANTHONY  PHQ9:  PHQ-2 given  GAD7:  NA  Questioned patient about current smoking habits Pt. has never smoked.  Ok to leave detailed message on voice mail for today's visit only yes, phone # [phone]

## 2019-10-23 DIAGNOSIS — R00.2 PALPITATIONS: ICD-10-CM

## 2019-10-23 DIAGNOSIS — Z79.899 MEDICATION MANAGEMENT: ICD-10-CM

## 2019-10-23 DIAGNOSIS — E78.5 HYPERLIPIDEMIA LDL GOAL <130: ICD-10-CM

## 2019-10-23 LAB
ALBUMIN SERPL-MCNC: 4.3 G/DL (ref 3.6–5.1)
ALBUMIN/GLOB SERPL: 1.7 {RATIO} (ref 1–2.5)
ALP SERPL-CCNC: 55 U/L (ref 33–130)
ALT 1742-6: 6 U/L (ref 5–30)
AST 1920-8: 7 U/L (ref 7–31)
BILIRUB SERPL-MCNC: 0.7 MG/DL (ref 0.2–1.2)
BUN SERPL-MCNC: 14 MG/DL (ref 7–25)
BUN/CREATININE RATIO: 15.7 (ref 6–22)
CALCIUM SERPL-MCNC: 8.9 MG/DL (ref 8.6–10.3)
CHLORIDE SERPLBLD-SCNC: 106 MMOL/L (ref 98–110)
CHOLEST SERPL-MCNC: 214 MG/DL (ref 0–199)
CHOLEST/HDLC SERPL: 4 {RATIO} (ref 0–5)
CO2 SERPL-SCNC: 31.7 MMOL/L (ref 20–32)
CREAT SERPL-MCNC: 0.91 MG/DL (ref 0.7–1.18)
GLOBULIN, CALCULATED - QUEST: 2.6 (ref 1.9–3.7)
GLUCOSE SERPL-MCNC: 95 MG/DL (ref 60–99)
HDLC SERPL-MCNC: 58 MG/DL (ref 40–150)
LDLC SERPL CALC-MCNC: 137 MG/DL (ref 0–130)
POTASSIUM SERPL-SCNC: 4.42 MMOL/L (ref 3.5–5.3)
PROT SERPL-MCNC: 6.9 G/DL (ref 6.1–8.1)
SODIUM SERPL-SCNC: 143.2 MMOL/L (ref 135–146)
TRIGL SERPL-MCNC: 96 MG/DL (ref 0–149)

## 2019-10-23 PROCEDURE — 80053 COMPREHEN METABOLIC PANEL: CPT | Performed by: FAMILY MEDICINE

## 2019-10-23 PROCEDURE — 36415 COLL VENOUS BLD VENIPUNCTURE: CPT | Performed by: FAMILY MEDICINE

## 2019-10-23 PROCEDURE — 80061 LIPID PANEL: CPT | Performed by: FAMILY MEDICINE

## 2019-10-23 PROCEDURE — 84443 ASSAY THYROID STIM HORMONE: CPT | Mod: 90 | Performed by: FAMILY MEDICINE

## 2019-10-24 LAB — TSH SERPL-ACNC: 1.02 MIU/L (ref 0.4–4.5)

## 2019-12-16 ENCOUNTER — HEALTH MAINTENANCE LETTER (OUTPATIENT)
Age: 59
End: 2019-12-16

## 2020-05-14 ENCOUNTER — TELEPHONE (OUTPATIENT)
Dept: FAMILY MEDICINE | Facility: CLINIC | Age: 60
End: 2020-05-14

## 2020-05-14 NOTE — TELEPHONE ENCOUNTER
Pt called stating she has had a hemorrhoid for 2 wks and is now having low back pain.  She made an appt for Tuesday to be seen.  She states it is not painfull, not bleeding does not bother her with BM's but wants to have it checked as it is not going away.  Will see Chiropractor for her back pain today

## 2020-05-19 ENCOUNTER — OFFICE VISIT (OUTPATIENT)
Dept: FAMILY MEDICINE | Facility: CLINIC | Age: 60
End: 2020-05-19

## 2020-05-19 VITALS
RESPIRATION RATE: 18 BRPM | WEIGHT: 176.8 LBS | HEIGHT: 72 IN | SYSTOLIC BLOOD PRESSURE: 114 MMHG | DIASTOLIC BLOOD PRESSURE: 70 MMHG | HEART RATE: 78 BPM | BODY MASS INDEX: 23.95 KG/M2 | OXYGEN SATURATION: 98 % | TEMPERATURE: 98.2 F

## 2020-05-19 DIAGNOSIS — K64.1 GRADE II INTERNAL HEMORRHOIDS: Primary | ICD-10-CM

## 2020-05-19 DIAGNOSIS — Z12.11 SPECIAL SCREENING FOR MALIGNANT NEOPLASMS, COLON: ICD-10-CM

## 2020-05-19 PROCEDURE — 99213 OFFICE O/P EST LOW 20 MIN: CPT | Performed by: FAMILY MEDICINE

## 2020-05-19 ASSESSMENT — MIFFLIN-ST. JEOR: SCORE: 1525.71

## 2020-05-19 NOTE — NURSING NOTE
Jennifer is here today for hemorrhoids.    Pre-visit Screening:  Immunizations:  up to date  Colonoscopy:  is up to date  Mammogram: is up to date  Asthma Action Test/Plan:  ANTHONY  PHQ9:  NA  GAD7:  NA  Questioned patient about current smoking habits Pt. has never smoked.  Ok to leave detailed message on voice mail for today's visit only Yes, phone # [phone]

## 2020-05-19 NOTE — PATIENT INSTRUCTIONS
1) Grade 2 internal hemorrhoid (primary encounter diagnosis)  Comment: diagnosis reviewed  Plan: Follow handout and monitor for resolution. Worrisome symptoms reviewed    (Z12.11) Special screening for malignant neoplasms, colon  Comment: schedule first colonoscopy  Plan: GASTROENTEROLOGY ADULT REF PROCEDURE ONLY         Other; [facility] [phone]

## 2020-05-19 NOTE — PROGRESS NOTES
SUBJECTIVE: 59 year old female complaining of a lump noted on the rectum for 1 month(s).   The patient describes findings it when wiping after a BM. Minimal itching and no pain. No melena noted. Chronic constipation for which she uses prunes on occasion.   The patient denies a history of abdominal pain, bleeding, fever or urinary issues.   Smoking history: No.   Relevant past medical history: positive for never has had a colonoscopy.    OBJECTIVE: The patient appears healthy, alert, no distress, cooperative and smiling.   CHEST: Regular rate and  rhythm. S1 and S2 normal, no murmurs, clicks, gallops or rubs. No edema or JVD. Chest is clear; no wheezes or rales.  ABD: The abdomen is soft without tenderness, guarding, mass or organomegaly. Bowel sounds are normal. No CVA tenderness or inguinal adenopathy noted.  RECTUM: Small 1 cm internal hemorrhoid with no pain. Rectum normal, negative guaiac.  BMI : Body mass index is 22.7 kg/m .      ASSESSMENT: (K64.1) Grade 2 internal hemorrhoid (primary encounter diagnosis)  Comment: diagnosis reviewed  Plan: Follow handout and monitor for resolution. Worrisome symptoms reviewed    (Z12.11) Special screening for malignant neoplasms, colon  Comment: schedule first colonoscopy  Plan: GASTROENTEROLOGY ADULT REF PROCEDURE ONLY         Other; [facility] [phone]

## 2020-09-16 ENCOUNTER — HOSPITAL ENCOUNTER (OUTPATIENT)
Dept: MAMMOGRAPHY | Facility: CLINIC | Age: 60
Discharge: HOME | End: 2020-09-16
Attending: FAMILY MEDICINE | Admitting: FAMILY MEDICINE
Payer: COMMERCIAL

## 2020-09-16 DIAGNOSIS — Z12.31 VISIT FOR SCREENING MAMMOGRAM: ICD-10-CM

## 2020-09-16 PROCEDURE — 77063 BREAST TOMOSYNTHESIS BI: CPT

## 2020-09-18 ENCOUNTER — OFFICE VISIT (OUTPATIENT)
Dept: FAMILY MEDICINE | Facility: CLINIC | Age: 60
End: 2020-09-18

## 2020-09-18 VITALS
WEIGHT: 179.8 LBS | SYSTOLIC BLOOD PRESSURE: 110 MMHG | DIASTOLIC BLOOD PRESSURE: 70 MMHG | HEART RATE: 69 BPM | TEMPERATURE: 98 F | HEIGHT: 72 IN | OXYGEN SATURATION: 98 % | BODY MASS INDEX: 24.35 KG/M2

## 2020-09-18 DIAGNOSIS — R00.2 PALPITATIONS: Primary | ICD-10-CM

## 2020-09-18 DIAGNOSIS — Z13.228 SCREENING FOR METABOLIC DISORDER: ICD-10-CM

## 2020-09-18 DIAGNOSIS — E78.5 HYPERLIPIDEMIA LDL GOAL <100: ICD-10-CM

## 2020-09-18 DIAGNOSIS — Z11.59 NEED FOR HEPATITIS C SCREENING TEST: ICD-10-CM

## 2020-09-18 PROCEDURE — 99213 OFFICE O/P EST LOW 20 MIN: CPT | Performed by: PHYSICIAN ASSISTANT

## 2020-09-18 RX ORDER — ATENOLOL 25 MG/1
25 TABLET ORAL DAILY
Qty: 90 TABLET | Refills: 3 | Status: SHIPPED | OUTPATIENT
Start: 2020-09-18 | End: 2021-10-04

## 2020-09-18 ASSESSMENT — ANXIETY QUESTIONNAIRES
5. BEING SO RESTLESS THAT IT IS HARD TO SIT STILL: NOT AT ALL
GAD7 TOTAL SCORE: 0
1. FEELING NERVOUS, ANXIOUS, OR ON EDGE: NOT AT ALL
7. FEELING AFRAID AS IF SOMETHING AWFUL MIGHT HAPPEN: NOT AT ALL
6. BECOMING EASILY ANNOYED OR IRRITABLE: NOT AT ALL
3. WORRYING TOO MUCH ABOUT DIFFERENT THINGS: NOT AT ALL
2. NOT BEING ABLE TO STOP OR CONTROL WORRYING: NOT AT ALL

## 2020-09-18 ASSESSMENT — PATIENT HEALTH QUESTIONNAIRE - PHQ9
SUM OF ALL RESPONSES TO PHQ QUESTIONS 1-9: 0
5. POOR APPETITE OR OVEREATING: NOT AT ALL

## 2020-09-18 ASSESSMENT — MIFFLIN-ST. JEOR: SCORE: 1539.32

## 2020-09-18 NOTE — PROGRESS NOTES
"CC: Medication Check    History:  Palpitations:  Takes atenolol 25 mg daily to help with palpitations. This is working well to control symptoms. Denies any side effects.     Anxiety:  Sees Dr. Neal every 4-5 months for sertraline. Working well. May consider having us take over managing this medication in the future.     PMH, MEDICATIONS, ALLERGIES, SOCIAL AND FAMILY HISTORY in [facility] and reviewed by me personally.    ROS negative other than the symptoms noted above in the HPI.    Examination   /70 (BP Location: Left arm, Patient Position: Sitting, Cuff Size: Adult Large)   Pulse 69   Temp 98  F (36.7  C) (Oral)   Ht 1.88 m (6' 2\")   Wt 81.6 kg (179 lb 12.8 oz)   SpO2 98%   BMI 23.08 kg/m       Constitutional: Sitting comfortably, in no acute distress. Vital signs noted  Neck:  no adenopathy, trachea midline and normal to palpation, thyroid normal to palpation  Cardiovascular:  regular rate and rhythm, no murmurs, clicks, or gallops  Respiratory:  normal respiratory rate and rhythm, lungs clear to auscultation  SKIN: No jaundice/pallor/rash.   Psychiatric: mentation appears normal and affect normal/bright      A/P    ICD-10-CM    1. Hyperlipidemia LDL goal <130  E78.5    2. Palpitations  R00.2 atenolol (TENORMIN) 25 MG tablet   3. Hyperlipidemia LDL goal <100  E78.5 VENOUS COLLECTION     Lipid Panel (BFP)   4. Screening for metabolic disorder  Z13.228 VENOUS COLLECTION     Basic Metabolic Panel (BFP)       DISCUSSION:  Palpitations:  Patient is doing well today on atenolol. Agreed to refill without change for 1 year. Agreed to have her return next week to update fasting labs, and she has history of elevated LDL. I will contact pt via [facility] with fasting labs results when available.    Considering Cologard or colonoscopy.      follow up visit: 1 year    Nicole Coleman PA-C  [facility]    "

## 2020-09-18 NOTE — NURSING NOTE
Jennifer is here for a med check she is not fasting.    Pre-Visit Screening:  Immunizations:UTD  Colonoscopy:NA  Mammogram:UTD  Asthma Action Test/Plan:NA  PHQ9:done today  GAD7:done today  Questioned patient about current smoking habits Pt.Never smoked  OK to leave a detailed message on voice mail for today's visit yes, phone # [phone]

## 2020-09-19 ASSESSMENT — ANXIETY QUESTIONNAIRES: GAD7 TOTAL SCORE: 0

## 2020-09-24 DIAGNOSIS — Z11.59 NEED FOR HEPATITIS C SCREENING TEST: ICD-10-CM

## 2020-09-24 DIAGNOSIS — E78.5 HYPERLIPIDEMIA LDL GOAL <100: ICD-10-CM

## 2020-09-24 DIAGNOSIS — Z13.228 SCREENING FOR METABOLIC DISORDER: ICD-10-CM

## 2020-09-24 LAB
BUN SERPL-MCNC: 19 MG/DL (ref 7–25)
BUN/CREATININE RATIO: 21.6 (ref 6–22)
CALCIUM SERPL-MCNC: 9.7 MG/DL (ref 8.6–10.3)
CHLORIDE SERPLBLD-SCNC: 104.8 MMOL/L (ref 98–110)
CHOLEST SERPL-MCNC: 225 MG/DL (ref 0–199)
CHOLEST/HDLC SERPL: 4 {RATIO} (ref 0–5)
CO2 SERPL-SCNC: 30 MMOL/L (ref 20–32)
CREAT SERPL-MCNC: 0.88 MG/DL (ref 0.7–1.18)
GLUCOSE SERPL-MCNC: 95 MG/DL (ref 60–99)
HDLC SERPL-MCNC: 64 MG/DL (ref 40–150)
LDLC SERPL CALC-MCNC: 142 MG/DL (ref 0–130)
POTASSIUM SERPL-SCNC: 4.82 MMOL/L (ref 3.5–5.3)
SODIUM SERPL-SCNC: 140.7 MMOL/L (ref 135–146)
TRIGL SERPL-MCNC: 97 MG/DL (ref 0–149)

## 2020-09-24 PROCEDURE — 80061 LIPID PANEL: CPT | Performed by: PHYSICIAN ASSISTANT

## 2020-09-24 PROCEDURE — 86803 HEPATITIS C AB TEST: CPT | Mod: 90 | Performed by: PHYSICIAN ASSISTANT

## 2020-09-24 PROCEDURE — 80048 BASIC METABOLIC PNL TOTAL CA: CPT | Performed by: PHYSICIAN ASSISTANT

## 2020-09-24 PROCEDURE — 36415 COLL VENOUS BLD VENIPUNCTURE: CPT | Performed by: PHYSICIAN ASSISTANT

## 2020-09-25 LAB
HCV AB - QUEST: NORMAL
SIGNAL TO CUT OFF - QUEST: 0.01

## 2021-01-15 ENCOUNTER — HEALTH MAINTENANCE LETTER (OUTPATIENT)
Age: 61
End: 2021-01-15

## 2021-02-15 NOTE — NURSING NOTE
"Chief Complaint   Patient presents with     Sleep Problem     \"Can't sleep\"       Initial /74  Pulse 65  Resp 16  Ht 1.854 m (6' 1\")  Wt 78.5 kg (173 lb)  SpO2 100%  BMI 22.82 kg/m2 Estimated body mass index is 22.82 kg/(m^2) as calculated from the following:    Height as of this encounter: 1.854 m (6' 1\").    Weight as of this encounter: 78.5 kg (173 lb).  Medication Reconciliation: complete     ESS 2  Neck 32cm  Virgen Mtz    " Medical Necessity Statement: Based on my medical judgement, Mohs surgery is the most appropriate treatment for this cancer compared to other treatments.

## 2021-02-26 ENCOUNTER — OFFICE VISIT (OUTPATIENT)
Dept: FAMILY MEDICINE | Facility: CLINIC | Age: 61
End: 2021-02-26

## 2021-02-26 VITALS
OXYGEN SATURATION: 98 % | TEMPERATURE: 98.1 F | WEIGHT: 179 LBS | SYSTOLIC BLOOD PRESSURE: 106 MMHG | HEART RATE: 67 BPM | HEIGHT: 72 IN | BODY MASS INDEX: 24.24 KG/M2 | DIASTOLIC BLOOD PRESSURE: 70 MMHG

## 2021-02-26 DIAGNOSIS — R10.13 EPIGASTRIC PAIN: ICD-10-CM

## 2021-02-26 DIAGNOSIS — K21.9 GASTROESOPHAGEAL REFLUX DISEASE WITHOUT ESOPHAGITIS: Primary | ICD-10-CM

## 2021-02-26 DIAGNOSIS — K21.9 ACID REFLUX: ICD-10-CM

## 2021-02-26 LAB
% GRANULOCYTES: 55.7 %
ALBUMIN SERPL-MCNC: 4.3 G/DL (ref 3.6–5.1)
ALBUMIN/GLOB SERPL: 1.9 {RATIO} (ref 1–2.5)
ALP SERPL-CCNC: 51 U/L (ref 33–130)
ALT 1742-6: 8 U/L (ref 0–32)
AST 1920-8: 5 U/L (ref 0–35)
BILIRUB SERPL-MCNC: 0.5 MG/DL (ref 0.2–1.2)
BUN SERPL-MCNC: 20 MG/DL (ref 7–25)
BUN/CREATININE RATIO: 23.5 (ref 6–22)
CALCIUM SERPL-MCNC: 8.9 MG/DL (ref 8.6–10.3)
CHLORIDE SERPLBLD-SCNC: 104.7 MMOL/L (ref 98–110)
CO2 SERPL-SCNC: 28.1 MMOL/L (ref 20–32)
CREAT SERPL-MCNC: 0.85 MG/DL (ref 0.6–1.3)
GLOBULIN, CALCULATED - QUEST: 2.3 (ref 1.9–3.7)
GLUCOSE SERPL-MCNC: 157 MG/DL (ref 60–99)
HCT VFR BLD AUTO: 41.6 % (ref 35–47)
HEMOGLOBIN: 13.2 G/DL (ref 11.7–15.7)
LYMPHOCYTES NFR BLD AUTO: 37.1 %
MCH RBC QN AUTO: 30.4 PG (ref 26–33)
MCHC RBC AUTO-ENTMCNC: 31.7 G/DL (ref 31–36)
MCV RBC AUTO: 95.9 FL (ref 78–100)
MONOCYTES NFR BLD AUTO: 7.2 %
PLATELET COUNT - QUEST: 251 10^9/L (ref 150–375)
POTASSIUM SERPL-SCNC: 4.18 MMOL/L (ref 3.5–5.3)
PROT SERPL-MCNC: 6.6 G/DL (ref 6.1–8.1)
RBC # BLD AUTO: 4.34 10*12/L (ref 3.8–5.2)
SODIUM SERPL-SCNC: 140 MMOL/L (ref 135–146)
WBC # BLD AUTO: 6.6 10*9/L (ref 4–11)

## 2021-02-26 PROCEDURE — 99214 OFFICE O/P EST MOD 30 MIN: CPT | Performed by: FAMILY MEDICINE

## 2021-02-26 PROCEDURE — 36415 COLL VENOUS BLD VENIPUNCTURE: CPT | Performed by: FAMILY MEDICINE

## 2021-02-26 PROCEDURE — 83690 ASSAY OF LIPASE: CPT | Mod: 90 | Performed by: FAMILY MEDICINE

## 2021-02-26 PROCEDURE — 82150 ASSAY OF AMYLASE: CPT | Mod: 90 | Performed by: FAMILY MEDICINE

## 2021-02-26 PROCEDURE — 85025 COMPLETE CBC W/AUTO DIFF WBC: CPT | Performed by: FAMILY MEDICINE

## 2021-02-26 PROCEDURE — 80053 COMPREHEN METABOLIC PANEL: CPT | Performed by: FAMILY MEDICINE

## 2021-02-26 RX ORDER — PANTOPRAZOLE SODIUM 20 MG/1
20 TABLET, DELAYED RELEASE ORAL DAILY
Qty: 90 TABLET | Refills: 0 | Status: SHIPPED | OUTPATIENT
Start: 2021-02-26 | End: 2021-05-05

## 2021-02-26 ASSESSMENT — MIFFLIN-ST. JEOR: SCORE: 1530.69

## 2021-02-26 NOTE — PROGRESS NOTES
Assessment & Plan   Problem List Items Addressed This Visit     None      Visit Diagnoses     Gastroesophageal reflux disease without esophagitis    -  Primary    Relevant Medications    lidocaine 15 mL, alum & mag hydroxide-simethicone 15 mL GI Cocktail    pantoprazole (PROTONIX) 20 MG EC tablet    Other Relevant Orders    Comprehensive Metobolic Panel (BFP)    HEMOGRAM PLATELET DIFF (BFP) (Completed)    Amylase (QUEST)    Lipase (QUEST)    RADIOLOGY REFERRAL    US Abdomen Complete    GASTROENTEROLOGY ADULT REF PROCEDURE ONLY    Epigastric pain        Relevant Medications    pantoprazole (PROTONIX) 20 MG EC tablet    Other Relevant Orders    RADIOLOGY REFERRAL    US Abdomen Complete    GASTROENTEROLOGY ADULT REF PROCEDURE ONLY         1. Gastroesophageal reflux disease without esophagitis    - lidocaine 15 mL, alum & mag hydroxide-simethicone 15 mL GI Cocktail; Take 30 mLs by mouth once for 1 dose  Dispense: 30 mL; Refill: 0  - Comprehensive Metobolic Panel (BFP)  - HEMOGRAM PLATELET DIFF (BFP)  - Amylase (QUEST)  - Lipase (QUEST)  - RADIOLOGY REFERRAL  - US Abdomen Complete  - GASTROENTEROLOGY ADULT REF PROCEDURE ONLY; Future  - pantoprazole (PROTONIX) 20 MG EC tablet; Take 1 tablet (20 mg) by mouth daily  Dispense: 90 tablet; Refill: 0    2. Epigastric pain  I think her back pain and epigastric pain are related. She had a GI cocktail which significantly helped the pain in her upper abdomen and back. I think this is less likely to be cardiac related due to multiple GI symptoms.  Labs done. Ultrasound and egd ordered. Stop omeprazole and start protonix. Can also try tums or maalox in addition. If worsening or changes in symptoms, should be seen here or in the ER.  - RADIOLOGY REFERRAL  - US Abdomen Complete  - GASTROENTEROLOGY ADULT REF PROCEDURE ONLY; Future  - pantoprazole (PROTONIX) 20 MG EC tablet; Take 1 tablet (20 mg) by mouth daily  Dispense: 90 tablet; Refill: 0             FUTURE APPOINTMENTS:       -  "Follow-up visit in 1 month if symptoms not improved.    No follow-ups on file.    Marguerite Knight MD  [facility]    Subjective     Nursing Notes:   Milly Leigh, QUETA  2/26/2021  1:59 PM  Signed  Jennifer is here today to discuss GERD and upper abdominal burning.    Pre-visit Screening:  Immunizations:  up to date  Colonoscopy:  is up to date  Mammogram: is up to date  Asthma Action Test/Plan:  NA  PHQ9:  NA  GAD7:  NA  Questioned patient about current smoking habits Pt. has never smoked.  Ok to leave detailed message on voice mail for today's visit only Yes, phone # [phone]           Skyla Lopez is a 60 year old female who presents to clinic today for the following health issues   HPI     Bloating, gassy belching and acid into the neck and throat.  Taking omeprazole and this helps the burning slightly. Feels like she has a bubble In the throat. And pain into the mid back. No history gallbladder problems.   This has been for 3 ;weeks, more constant. Not associated with certain foods. Stopped her vitamins and otc medications. Added her meds back and seemed to be ok except with vitamin c. The past 2-3 days are worse. Mouth is ok, but has had a burning sensation from the throat.  Exertion doesn't affect it. No shortness of breath. Has chronic tachycardia but otherwise heart is ok.   Takes gaviscon and this helps but hasn't taken this since starting the omeprazole.  Right now can feel it in her throat and in the back. Feels like burning and air bubble sensation.  No fevers.  Her neighbor suggested protonix.    Review of Systems   Constitutional, HEENT, cardiovascular, pulmonary, gi and gu systems are negative, except as otherwise noted.      Objective    /70 (BP Location: Left arm, Patient Position: Sitting, Cuff Size: Adult Regular)   Pulse 67   Temp 98.1  F (36.7  C) (Oral)   Ht 1.88 m (6' 2\")   Wt 81.2 kg (179 lb)   LMP 01/29/2014   SpO2 98%   BMI 22.98 kg/m    Body mass " index is 22.98 kg/m .  Physical Exam   GENERAL: healthy, alert and no distress  NECK: no adenopathy, no asymmetry, masses, or scars and thyroid normal to palpation  RESP: lungs clear to auscultation - no rales, rhonchi or wheezes  CV: regular rate and rhythm, normal S1 S2, no S3 or S4, no murmur, click or rub, no peripheral edema and peripheral pulses strong  ABDOMEN: soft, nontender, no hepatosplenomegaly, no masses and bowel sounds normal  MS: no gross musculoskeletal defects noted, no edema  Tender in epigastric area to palpation    Results for orders placed or performed in visit on 02/26/21 (from the past 24 hour(s))   HEMOGRAM PLATELET DIFF (BFP)   Result Value Ref Range    WBC 6.6 4.0 - 11 10*9/L    RBC Count 4.34 3.8 - 5.2 10*12/L    Hemoglobin 13.2 11.7 - 15.7 g/dL    Hematocrit 41.6 35.0 - 47.0 %    MCV 95.9 78 - 100 fL    MCH 30.4 26 - 33 pg    MCHC 31.7 31 - 36 g/dL    Platelet Count 251 150 - 375 10^9/L    % Granulocytes 55.7 %    % Lymphocytes 37.1 %    % Monocytes 7.2 %

## 2021-02-26 NOTE — PATIENT INSTRUCTIONS
Assessment & Plan   Problem List Items Addressed This Visit     None      Visit Diagnoses     Gastroesophageal reflux disease without esophagitis    -  Primary    Relevant Medications    lidocaine 15 mL, alum & mag hydroxide-simethicone 15 mL GI Cocktail    pantoprazole (PROTONIX) 20 MG EC tablet    Other Relevant Orders    Comprehensive Metobolic Panel (BFP)    HEMOGRAM PLATELET DIFF (BFP) (Completed)    Amylase (QUEST)    Lipase (QUEST)    RADIOLOGY REFERRAL    US Abdomen Complete    GASTROENTEROLOGY ADULT REF PROCEDURE ONLY    Epigastric pain        Relevant Medications    pantoprazole (PROTONIX) 20 MG EC tablet    Other Relevant Orders    RADIOLOGY REFERRAL    US Abdomen Complete    GASTROENTEROLOGY ADULT REF PROCEDURE ONLY         1. Gastroesophageal reflux disease without esophagitis    - lidocaine 15 mL, alum & mag hydroxide-simethicone 15 mL GI Cocktail; Take 30 mLs by mouth once for 1 dose  Dispense: 30 mL; Refill: 0  - Comprehensive Metobolic Panel (BFP)  - HEMOGRAM PLATELET DIFF (BFP)  - Amylase (QUEST)  - Lipase (QUEST)  - RADIOLOGY REFERRAL  - US Abdomen Complete  - GASTROENTEROLOGY ADULT REF PROCEDURE ONLY; Future  - pantoprazole (PROTONIX) 20 MG EC tablet; Take 1 tablet (20 mg) by mouth daily  Dispense: 90 tablet; Refill: 0    2. Epigastric pain  I think her back pain and epigastric pain are related. She had a GI cocktail which significantly helped the pain in her upper abdomen and back. I think this is less likely to be cardiac related due to multiple GI symptoms.  Labs done. Ultrasound and egd ordered. Stop omeprazole and start protonix. Can also try tums or maalox in addition. If worsening or changes in symptoms, should be seen here or in the ER.  - RADIOLOGY REFERRAL  - US Abdomen Complete  - GASTROENTEROLOGY ADULT REF PROCEDURE ONLY; Future  - pantoprazole (PROTONIX) 20 MG EC tablet; Take 1 tablet (20 mg) by mouth daily  Dispense: 90 tablet; Refill: 0             FUTURE APPOINTMENTS:       -  Follow-up visit in 1 month if symptoms not improved.    No follow-ups on file.    Marguerite Knight MD  [facility]

## 2021-02-26 NOTE — NURSING NOTE
Jennifer is here today to discuss GERD and upper abdominal burning.    Pre-visit Screening:  Immunizations:  up to date  Colonoscopy:  is up to date  Mammogram: is up to date  Asthma Action Test/Plan:  ANTHONY  PHQ9:  NA  GAD7:  NA  Questioned patient about current smoking habits Pt. has never smoked.  Ok to leave detailed message on voice mail for today's visit only Yes, phone # [phone]

## 2021-02-27 LAB
AMYLASE SERPL-CCNC: 46 U/L (ref 21–101)
LIPASE SERPL-CCNC: 32 U/L (ref 7–60)

## 2021-03-12 ENCOUNTER — TELEPHONE (OUTPATIENT)
Dept: FAMILY MEDICINE | Facility: CLINIC | Age: 61
End: 2021-03-12

## 2021-03-12 NOTE — TELEPHONE ENCOUNTER
Patient called in asking for her results of the ultrasound that she had done this past Wednesday. I called her back and left a message informing her that Dr. Knight is out of office this week but will return on Monday where she will interpret the results for the patient. Routing to Dr. Knight for review, please advise patient on what results were. She knows that this will be on Monday.    Patient can be reached at [phone]

## 2021-05-05 DIAGNOSIS — K21.9 GASTROESOPHAGEAL REFLUX DISEASE WITHOUT ESOPHAGITIS: ICD-10-CM

## 2021-05-05 DIAGNOSIS — R10.13 EPIGASTRIC PAIN: ICD-10-CM

## 2021-05-05 RX ORDER — PANTOPRAZOLE SODIUM 20 MG/1
TABLET, DELAYED RELEASE ORAL
Qty: 90 TABLET | Refills: 0 | Status: SHIPPED | OUTPATIENT
Start: 2021-05-05 | End: 2021-10-27

## 2021-05-05 NOTE — TELEPHONE ENCOUNTER
Received incoming refill request for  Pending Prescriptions:                       Disp   Refills    pantoprazole (PROTONIX) 20 MG EC tablet [*90 tab*0            Sig: TAKE 1 TABLET(20 MG) BY MOUTH DAILY    Patient last had a refill of this on 2/26/21 for a 90 day supply. Routing to Dr. Knight for approval or denial of refill request.

## 2021-05-06 NOTE — TELEPHONE ENCOUNTER
Patient was informed that this was sent in via [facility] message and she was asked about getting in with GI.

## 2021-05-17 ENCOUNTER — OFFICE VISIT (OUTPATIENT)
Dept: FAMILY MEDICINE | Facility: CLINIC | Age: 61
End: 2021-05-17

## 2021-05-17 VITALS
SYSTOLIC BLOOD PRESSURE: 110 MMHG | DIASTOLIC BLOOD PRESSURE: 70 MMHG | OXYGEN SATURATION: 97 % | BODY MASS INDEX: 22.98 KG/M2 | HEART RATE: 66 BPM | TEMPERATURE: 98.3 F | WEIGHT: 179 LBS

## 2021-05-17 DIAGNOSIS — J01.80 ACUTE NON-RECURRENT SINUSITIS OF OTHER SINUS: Primary | ICD-10-CM

## 2021-05-17 DIAGNOSIS — Z20.818 EXPOSURE TO STREP THROAT: ICD-10-CM

## 2021-05-17 LAB — S PYO AG THROAT QL IA.RAPID: NORMAL

## 2021-05-17 PROCEDURE — 87880 STREP A ASSAY W/OPTIC: CPT | Performed by: FAMILY MEDICINE

## 2021-05-17 PROCEDURE — 87070 CULTURE OTHR SPECIMN AEROBIC: CPT | Performed by: FAMILY MEDICINE

## 2021-05-17 PROCEDURE — 99214 OFFICE O/P EST MOD 30 MIN: CPT | Performed by: FAMILY MEDICINE

## 2021-05-17 PROCEDURE — G2023 SPECIMEN COLLECT COVID-19: HCPCS | Mod: 90 | Performed by: FAMILY MEDICINE

## 2021-05-17 NOTE — NURSING NOTE
Jennifer is here because 10 days ago, pressure in sinus, runny nose, drainage. Exposed to strep.     Pre-Visit Screening:  Immunizations:shingles vaccine  Colonoscopy:needs  Mammogram:NA  Asthma Action Test/Plan:NA  PHQ9:NA  GAD7:Na  Questioned patient about current smoking habits Pt.never smoked  OK to leave a detailed message on voice mail for today's visit yes, phone #[phone]  ACP-dicussed

## 2021-05-17 NOTE — PROGRESS NOTES
Assessment & Plan   Problem List Items Addressed This Visit     None      Visit Diagnoses     Acute non-recurrent sinusitis of other sinus    -  Primary    Relevant Orders    Rapid Strep Screen ([facility])    SARS CoV2 CoVid 19 Qual  ([facility])    Exposure to strep throat        Relevant Orders    Rapid Strep Screen ([facility])         1. Acute non-recurrent sinusitis of other sinus  Likely viral. I think she should give this more time, call me later this week or by Monday if continued symptoms and then consider starting an antibiotic.  Strep test done, covid test pending. Discussed also self isolation for covid.  - Rapid Strep Screen ([facility])  - SARS CoV2 CoVid 19 Qual  ([facility])    2. Exposure to strep throat    - Rapid Strep Screen ([facility])    If you have been tested for COVID-19 or have concerns about it:    Stay home and away from others:    If possible, stay away from others, especially people who are at  higher risk for getting very sick from COVID-19, such as older  adults and people with other medical conditions.    If you have been in contact with someone with COVID-19, stay home   and away from others for 14 days after your last contact with that person.   Follow the recommendations of your local public health  department if you need to quarantine.    If you have a fever, cough or other symptoms of COVID-19, stay home   and away from others (except to get medical care).    Monitor your health:    Watch for fever, cough, shortness of breath, or other  symptoms of COVID-19.    Remember, symptoms may appear 2-14 days after exposure  to COVID-19 and can include:      Fever or chills    Cough    Shortness of breath or  difficulty breathing    Tiredness    Muscle or body aches    Headache    New loss of taste or  smell    Sore throat    Congestion or runny  nose    Nausea or vomiting    Diarrhea    Call your clinic if your symptoms change or worsen.  You can also reference up to date information on the following websites regarding  COVID-19:    https://www.cdc.gov/coronavirus/2019-ncov/index.html  https://[place].gov/covid19/    Results for orders placed or performed in visit on 05/17/21   Rapid Strep Screen ([facility])     Status: None    Specimen: Swab   Result Value Ref Range    Rapid Strep A Screen NEG neg            FUTURE APPOINTMENTS:       - Follow-up visit as needed    No follow-ups on file.    Marguerite Knight MD  [facility]    Subjective     Nursing Notes:   Katya Tidwell, [facility]  5/17/2021 10:45 AM  Signed  Jennifer is here because 10 days ago, pressure in sinus, runny nose, drainage. Exposed to strep.     Pre-Visit Screening:  Immunizations:shingles vaccine  Colonoscopy:needs  Mammogram:NA  Asthma Action Test/Plan:NA  PHQ9:NA  GAD7:Na  Questioned patient about current smoking habits Pt.never smoked  OK to leave a detailed message on voice mail for today's visit yes, phone #[phone]  ACP-dicussed            Skyla Lopez is a 60 year old female who presents to clinic today for the following health issues   HPI     Sinus congestion for 10 days, cough. Pressure. Was exposed to grandson with strep. He also had a cold. Other grandkids had colds and neg for covid.  No sore throat. Throat is irritated for drainage.  Has had 2 covid vaccines.  Cough due to drainage.   No fevers.     Review of Systems   Constitutional, HEENT, cardiovascular, pulmonary, gi and gu systems are negative, except as otherwise noted.      Objective    /70 (BP Location: Left arm, Patient Position: Sitting, Cuff Size: Adult Large)   Pulse 66   Temp 98.3  F (36.8  C) (Oral)   Wt 81.2 kg (179 lb)   LMP 01/29/2014   SpO2 97%   BMI 22.98 kg/m    Body mass index is 22.98 kg/m .  Physical Exam   GENERAL: healthy, alert and no distress  EYES: Eyes grossly normal to inspection, PERRL and conjunctivae and sclerae normal  HENT: ear canals and TM's normal, nose and mouth without ulcers or lesions  NECK: no adenopathy, no asymmetry, masses, or scars  and thyroid normal to palpation  RESP: lungs clear to auscultation - no rales, rhonchi or wheezes  CV: regular rate and rhythm, normal S1 S2, no S3 or S4, no murmur, click or rub, no peripheral edema and peripheral pulses strong  ABDOMEN: soft, nontender, no hepatosplenomegaly, no masses and bowel sounds normal  MS: no gross musculoskeletal defects noted, no edema  NEURO: Normal strength and tone, mentation intact and speech normal  PSYCH: mentation appears normal, affect normal/bright    Results for orders placed or performed in visit on 05/17/21 (from the past 24 hour(s))   Rapid Strep Screen ([facility])    Specimen: Swab   Result Value Ref Range    Rapid Strep A Screen NEG neg

## 2021-05-17 NOTE — PATIENT INSTRUCTIONS
Assessment & Plan   Problem List Items Addressed This Visit     None      Visit Diagnoses     Acute non-recurrent sinusitis of other sinus    -  Primary    Relevant Orders    Rapid Strep Screen ([facility])    SARS CoV2 CoVid 19 Qual  ([facility])    Exposure to strep throat        Relevant Orders    Rapid Strep Screen ([facility])         1. Acute non-recurrent sinusitis of other sinus  Likely viral. I think she should give this more time, call me later this week or by Monday if continued symptoms and then consider starting an antibiotic.  Strep test done, covid test pending. Discussed also self isolation for covid.  - Rapid Strep Screen ([facility])  - SARS CoV2 CoVid 19 Qual  ([facility])    2. Exposure to strep throat    - Rapid Strep Screen ([facility])    If you have been tested for COVID-19 or have concerns about it:    Stay home and away from others:    If possible, stay away from others, especially people who are at  higher risk for getting very sick from COVID-19, such as older  adults and people with other medical conditions.    If you have been in contact with someone with COVID-19, stay home   and away from others for 14 days after your last contact with that person.   Follow the recommendations of your local public health  department if you need to quarantine.    If you have a fever, cough or other symptoms of COVID-19, stay home   and away from others (except to get medical care).    Monitor your health:    Watch for fever, cough, shortness of breath, or other  symptoms of COVID-19.    Remember, symptoms may appear 2-14 days after exposure  to COVID-19 and can include:      Fever or chills    Cough    Shortness of breath or  difficulty breathing    Tiredness    Muscle or body aches    Headache    New loss of taste or  smell    Sore throat    Congestion or runny  nose    Nausea or vomiting    Diarrhea    Call your clinic if your symptoms change or worsen.  You can also reference up to date information on the following websites regarding  COVID-19:    https://www.cdc.gov/coronavirus/2019-ncov/index.html  https://[place].gov/covid19/    Results for orders placed or performed in visit on 05/17/21   Rapid Strep Screen ([facility])     Status: None    Specimen: Swab   Result Value Ref Range    Rapid Strep A Screen NEG neg            FUTURE APPOINTMENTS:       - Follow-up visit as needed    No follow-ups on file.    Marguerite Knight MD  [facility]

## 2021-05-18 LAB — SARS-COV-2 RNA SPEC QL NAA+PROBE: NOT DETECTED

## 2021-05-19 LAB — STREP GROUP A CULTURE, THROAT - QUEST: NORMAL

## 2021-09-05 ENCOUNTER — HEALTH MAINTENANCE LETTER (OUTPATIENT)
Age: 61
End: 2021-09-05

## 2021-10-04 DIAGNOSIS — R00.2 PALPITATIONS: ICD-10-CM

## 2021-10-04 RX ORDER — ATENOLOL 25 MG/1
25 TABLET ORAL DAILY
Qty: 30 TABLET | Refills: 0 | COMMUNITY
Start: 2021-10-04 | End: 2021-10-27

## 2021-10-04 NOTE — TELEPHONE ENCOUNTER
Jennifer called requesting an extension of atenolol until her appt on 10/27/21. I called in a 30 day supply to her pharmacy.        Signed Prescriptions:                        Disp   Refills    atenolol (TENORMIN) 25 MG tablet           30 tab*0        Sig: Take 1 tablet (25 mg) by mouth daily  Authorizing Provider: OSWALDO LOMBARDI  Ordering User: BRAYDON MCGREGOR

## 2021-10-18 ENCOUNTER — HOSPITAL ENCOUNTER (OUTPATIENT)
Dept: MAMMOGRAPHY | Facility: CLINIC | Age: 61
Discharge: HOME | End: 2021-10-18
Attending: OBSTETRICS & GYNECOLOGY | Admitting: OBSTETRICS & GYNECOLOGY
Payer: COMMERCIAL

## 2021-10-18 DIAGNOSIS — Z12.31 VISIT FOR SCREENING MAMMOGRAM: ICD-10-CM

## 2021-10-18 PROCEDURE — 77063 BREAST TOMOSYNTHESIS BI: CPT

## 2021-10-27 ENCOUNTER — OFFICE VISIT (OUTPATIENT)
Dept: FAMILY MEDICINE | Facility: CLINIC | Age: 61
End: 2021-10-27

## 2021-10-27 VITALS
WEIGHT: 179.4 LBS | TEMPERATURE: 97.8 F | HEART RATE: 80 BPM | DIASTOLIC BLOOD PRESSURE: 68 MMHG | SYSTOLIC BLOOD PRESSURE: 106 MMHG | BODY MASS INDEX: 24.3 KG/M2 | HEIGHT: 72 IN | OXYGEN SATURATION: 98 %

## 2021-10-27 DIAGNOSIS — Z13.220 LIPID SCREENING: ICD-10-CM

## 2021-10-27 DIAGNOSIS — R00.2 PALPITATIONS: ICD-10-CM

## 2021-10-27 DIAGNOSIS — K21.9 GASTROESOPHAGEAL REFLUX DISEASE WITHOUT ESOPHAGITIS: ICD-10-CM

## 2021-10-27 DIAGNOSIS — Z13.1 SCREENING FOR DIABETES MELLITUS: ICD-10-CM

## 2021-10-27 DIAGNOSIS — R10.13 EPIGASTRIC PAIN: ICD-10-CM

## 2021-10-27 DIAGNOSIS — Z76.89 SLEEP CONCERN: ICD-10-CM

## 2021-10-27 DIAGNOSIS — E55.9 VITAMIN D DEFICIENCY: ICD-10-CM

## 2021-10-27 DIAGNOSIS — R22.32 MASS OF LEFT HAND: Primary | ICD-10-CM

## 2021-10-27 PROCEDURE — 99214 OFFICE O/P EST MOD 30 MIN: CPT | Performed by: FAMILY MEDICINE

## 2021-10-27 RX ORDER — PANTOPRAZOLE SODIUM 20 MG/1
20 TABLET, DELAYED RELEASE ORAL DAILY
Qty: 90 TABLET | Refills: 1 | Status: SHIPPED | OUTPATIENT
Start: 2021-10-27 | End: 2022-06-01

## 2021-10-27 RX ORDER — PANTOPRAZOLE SODIUM 20 MG/1
20 TABLET, DELAYED RELEASE ORAL DAILY
Qty: 90 TABLET | Refills: 3 | Status: SHIPPED | OUTPATIENT
Start: 2021-10-27 | End: 2021-10-27

## 2021-10-27 RX ORDER — ATENOLOL 25 MG/1
25 TABLET ORAL DAILY
Qty: 90 TABLET | Refills: 1 | Status: SHIPPED | OUTPATIENT
Start: 2021-10-27 | End: 2022-06-01

## 2021-10-27 RX ORDER — ATENOLOL 25 MG/1
25 TABLET ORAL DAILY
Qty: 90 TABLET | Refills: 3 | Status: SHIPPED | OUTPATIENT
Start: 2021-10-27 | End: 2021-10-27

## 2021-10-27 ASSESSMENT — MIFFLIN-ST. JEOR: SCORE: 1532.5

## 2021-10-27 NOTE — PATIENT INSTRUCTIONS
Assessment & Plan   Problem List Items Addressed This Visit     None      Visit Diagnoses     Mass of left hand    -  Primary    Palpitations        Relevant Medications    atenolol (TENORMIN) 25 MG tablet    Gastroesophageal reflux disease without esophagitis        Relevant Medications    pantoprazole (PROTONIX) 20 MG EC tablet    Epigastric pain        Relevant Medications    pantoprazole (PROTONIX) 20 MG EC tablet    Sleep concern        Relevant Orders    TSH WITH FREE T4 REFLEX ([facility])    Magnesium ([facility])    Lipid screening        Relevant Orders    VENOUS COLLECTION    Lipid Panel ([facility])    Screening for diabetes mellitus        Relevant Orders    Glucose Fasting ([facility])    Vitamin D deficiency        Relevant Orders    VITAMIN D DEFICIENCY SCREENING ([facility])           1. Palpitations  Controlled with atenolo. Refilled.  - atenolol (TENORMIN) 25 MG tablet; Take 1 tablet (25 mg) by mouth daily  Dispense: 90 tablet; Refill: 3    2. Gastroesophageal reflux disease without esophagitis  Refilled. This controls her symptoms. Do the EGD, was referred previously.  - pantoprazole (PROTONIX) 20 MG EC tablet; Take 1 tablet (20 mg) by mouth daily  Dispense: 90 tablet; Refill: 3    3. Epigastric pain    - pantoprazole (PROTONIX) 20 MG EC tablet; Take 1 tablet (20 mg) by mouth daily  Dispense: 90 tablet; Refill: 3    4. Mass of left hand  Offered referral to ortho, she will wait on this for now.    5. Sleep concern  Check labs.  - TSH WITH FREE T4 REFLEX ([facility]); Future  - Magnesium ([facility]); Future    6. Lipid screening  followup for fasting labs.  - VENOUS COLLECTION; Future  - Lipid Panel ([facility]); Future    7. Screening for diabetes mellitus    - Glucose Fasting ([facility]); Future    8. Vitamin D deficiency    - VITAMIN D DEFICIENCY SCREENING ([facility]); Future           FUTURE APPOINTMENTS:       - Follow-up visit in 6 months.    No follow-ups on file.    Marguerite Knight MD  [facility]

## 2021-10-27 NOTE — PROGRESS NOTES
Assessment & Plan   Problem List Items Addressed This Visit     None      Visit Diagnoses     Mass of left hand    -  Primary    Palpitations        Relevant Medications    atenolol (TENORMIN) 25 MG tablet    Gastroesophageal reflux disease without esophagitis        Relevant Medications    pantoprazole (PROTONIX) 20 MG EC tablet    Epigastric pain        Relevant Medications    pantoprazole (PROTONIX) 20 MG EC tablet    Sleep concern        Relevant Orders    TSH WITH FREE T4 REFLEX ([facility])    Magnesium ([facility])    Lipid screening        Relevant Orders    VENOUS COLLECTION    Lipid Panel ([facility])    Screening for diabetes mellitus        Relevant Orders    Glucose Fasting ([facility])    Vitamin D deficiency        Relevant Orders    VITAMIN D DEFICIENCY SCREENING ([facility])           1. Palpitations  Controlled with atenolol. Refilled.  - atenolol (TENORMIN) 25 MG tablet; Take 1 tablet (25 mg) by mouth daily  Dispense: 90 tablet; Refill: 3    2. Gastroesophageal reflux disease without esophagitis  Refilled. This controls her symptoms. Do the EGD, was referred previously.  - pantoprazole (PROTONIX) 20 MG EC tablet; Take 1 tablet (20 mg) by mouth daily  Dispense: 90 tablet; Refill: 3    3. Epigastric pain    - pantoprazole (PROTONIX) 20 MG EC tablet; Take 1 tablet (20 mg) by mouth daily  Dispense: 90 tablet; Refill: 3    4. Mass of left hand  Offered referral to ortho, she will wait on this for now.    5. Sleep concern  Check labs.  - TSH WITH FREE T4 REFLEX ([facility]); Future  - Magnesium ([facility]); Future    6. Lipid screening  followup for fasting labs.  - VENOUS COLLECTION; Future  - Lipid Panel ([facility]); Future    7. Screening for diabetes mellitus    - Glucose Fasting ([facility]); Future    8. Vitamin D deficiency    - VITAMIN D DEFICIENCY SCREENING ([facility]); Future           FUTURE APPOINTMENTS:       - Follow-up visit in 6 months.    No follow-ups on file.    Marguerite Knight MD  [facility]    [facility]     Nursing  "Notes:   Milly Leigh, [facility]  10/27/2021 10:36 AM  Signed  Chief Complaint   Patient presents with     Recheck Medication     non-fasting today      Pre-visit Screening:  Immunizations:  up to date  Colonoscopy:  is due and ordered today  Mammogram: is up to date  Asthma Action Test/Plan:  NA  PHQ9:  NA  GAD7:  NA  Questioned patient about current smoking habits Pt. has never smoked.  Ok to leave detailed message on voice mail for today's visit only Yes, phone # [phone]           Skyla Lopez is a 60 year old female who presents to clinic today for the following health issues   HPI     Here for a medication check.  Is on atenolol for the tachycardia. This is working well.    Has been on pantoprazole for gerd. This was working but when she stopped it, got the sx back.  Tried omeprazole but feels weird with this. So stopped it.    She wants to come in next week to do labs.  Takes vitamin d, wants her level checked. Not sure if has been deficient in the past.   Wants lipids done.  Has sleep difficulties, wants to check her thyroid, mg. Uses a mg spray at night to help her sleep.  Has a lump on the left palm. No pain, has gotten better. No injury.        Review of Systems   Constitutional, HEENT, cardiovascular, pulmonary, gi and gu systems are negative, except as otherwise noted.      Objective    /68 (BP Location: Left arm, Patient Position: Sitting, Cuff Size: Adult Regular)   Pulse 80   Temp 97.8  F (36.6  C) (Temporal)   Ht 1.88 m (6' 2\")   Wt 81.4 kg (179 lb 6.4 oz)   LMP 01/29/2014   SpO2 98%   BMI 23.03 kg/m    Body mass index is 23.03 kg/m .  Physical Exam   GENERAL: healthy, alert and no distress  NECK: no adenopathy, no asymmetry, masses, or scars and thyroid normal to palpation  RESP: lungs clear to auscultation - no rales, rhonchi or wheezes  CV: regular rate and rhythm, normal S1 S2, no S3 or S4, no murmur, click or rub, no peripheral edema and peripheral pulses strong  ABDOMEN: " soft, nontender, no hepatosplenomegaly, no masses and bowel sounds normal  MS: no gross musculoskeletal defects noted, no edema  NEURO: Normal strength and tone, mentation intact and speech normal  PSYCH: mentation appears normal, affect normal/bright  palf of left hand small mass not inflammed, non tender non mobile over 3rd metacarpal    No results found for any visits on 10/27/21.

## 2021-10-27 NOTE — NURSING NOTE
Chief Complaint   Patient presents with     Recheck Medication     non-fasting today      Pre-visit Screening:  Immunizations:  up to date  Colonoscopy:  is due and ordered today  Mammogram: is up to date  Asthma Action Test/Plan:  NA  PHQ9:  NA  GAD7:  NA  Questioned patient about current smoking habits Pt. has never smoked.  Ok to leave detailed message on voice mail for today's visit only Yes, phone # [phone]

## 2021-11-09 DIAGNOSIS — E55.9 VITAMIN D DEFICIENCY: ICD-10-CM

## 2021-11-09 DIAGNOSIS — Z13.220 LIPID SCREENING: ICD-10-CM

## 2021-11-09 DIAGNOSIS — Z76.89 SLEEP CONCERN: ICD-10-CM

## 2021-11-09 DIAGNOSIS — Z13.1 SCREENING FOR DIABETES MELLITUS: ICD-10-CM

## 2021-11-09 LAB
CHOLEST SERPL-MCNC: 251 MG/DL (ref 0–199)
CHOLEST/HDLC SERPL: 4 {RATIO} (ref 0–5)
GLUCOSE SERPL-MCNC: 89 MG/DL (ref 60–99)
HDLC SERPL-MCNC: 63 MG/DL (ref 40–150)
LDLC SERPL CALC-MCNC: 162 MG/DL (ref 0–130)
TRIGL SERPL-MCNC: 128 MG/DL (ref 0–149)

## 2021-11-09 PROCEDURE — 36415 COLL VENOUS BLD VENIPUNCTURE: CPT | Performed by: FAMILY MEDICINE

## 2021-11-09 PROCEDURE — 82306 VITAMIN D 25 HYDROXY: CPT | Performed by: FAMILY MEDICINE

## 2021-11-09 PROCEDURE — 80061 LIPID PANEL: CPT | Performed by: FAMILY MEDICINE

## 2021-11-09 PROCEDURE — 84443 ASSAY THYROID STIM HORMONE: CPT | Performed by: FAMILY MEDICINE

## 2021-11-09 PROCEDURE — 83735 ASSAY OF MAGNESIUM: CPT | Performed by: FAMILY MEDICINE

## 2021-11-09 PROCEDURE — 82947 ASSAY GLUCOSE BLOOD QUANT: CPT | Performed by: FAMILY MEDICINE

## 2021-11-09 NOTE — LETTER
[facility]  [address], [address]  [place]  [address]    November 24, 2021        Skyla Lopez  [address] [address] [facility] [address]              Dear Skyla Lopez    Your labs: thyroid was ok, vitamin d in range, magnesium ok, glucose normal.  Your cholesterol was high, higher than it has been previously. Continue to work on healthy diet and regular exercise. Please come in fasting in 3-6 months, lets recheck this and if still high, we should consider starting a cholesterol medication.  Let me know if you have any questions.    I sent you a message in my chart with your results.         If you have any further questions or problems, please contact our office at [phone].          Marguerite Knight MD

## 2021-11-10 LAB
MAGNESIUM SERPL-MCNC: 2 MG/DL (ref 1.5–2.5)
TSH SERPL-ACNC: 1.39 MIU/L (ref 0.4–4.5)
VITAMIN D, 25-OH, TOTAL - QUEST: 34 NG/ML (ref 30–100)

## 2021-12-29 ENCOUNTER — TELEPHONE (OUTPATIENT)
Dept: FAMILY MEDICINE | Facility: CLINIC | Age: 61
End: 2021-12-29

## 2021-12-29 NOTE — TELEPHONE ENCOUNTER
Usual maximum recommended dose is 150 mg/day. If she is on higher doses, this would be best followed by a psychiatrist. She should ask her current psychiatrist for recommendations.  Also update her med list with us, we have that she is on 75 mg/day.

## 2021-12-29 NOTE — TELEPHONE ENCOUNTER
If she has been stable on her medications, if I can get the notes from previous year, yes, I can follow her for this.

## 2021-12-29 NOTE — TELEPHONE ENCOUNTER
Skyla M Ericson called the clinic support line with the following:    States that her Psych provider retired. Was to get a replacement, but that Psych provider did not join the practice.    She is wondering if you would be willing to prescribe her sertraline 75 mg from now on.    Please advise    [phone] (home)

## 2021-12-29 NOTE — PROGRESS NOTES
Assessment & Plan   Problem List Items Addressed This Visit     None         Video-Visit Details    Type of service:  Video Visit    Video Start Time (time video started): 11:56  Video End Time (time video stopped): 12:05    Originating Location (pt. Location): Home    Distant Location (provider location):  [facility]     Mode of Communication:  Video Conference via doximity    Physician has received verbal consent for a Video Visit from the patient? Yes      Marguerite Knight MD      1. Panic disorder with agoraphobia and moderate panic attacks    - sertraline (ZOLOFT) 25 MG tablet; Take 1 tablet (25 mg) by mouth 3 times daily  Dispense: 270 tablet; Refill: 1    2. Anxiety  She is doing well on this dose, would like to refill. This sounds reasonable, she has been taking this for years.   - sertraline (ZOLOFT) 25 MG tablet; Take 1 tablet (25 mg) by mouth 3 times daily  Dispense: 270 tablet; Refill: 1           FUTURE APPOINTMENTS:       - Follow-up visit in 6 months.    No follow-ups on file.    Marguerite Knight MD  [facility]    Subjective     There are no exam notes on file for this visit.     Skyla Lopez is a 61 year old female who presents to clinic today for the following health issues   HPI     Video visit regarding her sertraline.  Has been on this for 6-7 years with psychiatry. For anxiety. Started this during menopause. Had a panic attack in [place]. Doesn't fly due to anxiety. Has some claustrophobia with elevators and planes.  Initially she went on  Hormones and this helped somewhat. Started very slowly on sertraline. Has been feeling well on this. Her psychiatrist retired.  Doesn't feel like she needs to see psychiatry anymore. Just needs a med check. Doesn't need therapy. Denies SI. Never had depression.  Has to take 25 mg tabs.        Review of Systems   Constitutional, HEENT, cardiovascular, pulmonary, gi and gu systems are negative, except as otherwise noted.       Objective    LMP 01/29/2014   There is no height or weight on file to calculate BMI.  Physical Exam   GENERAL: healthy, alert and no distress  MS: no gross musculoskeletal defects noted, no edema  NEURO: Normal strength and tone, mentation intact and speech normal  PSYCH: mentation appears normal, affect normal/bright  Visit limited by video

## 2021-12-30 ENCOUNTER — OFFICE VISIT (OUTPATIENT)
Dept: FAMILY MEDICINE | Facility: CLINIC | Age: 61
End: 2021-12-30

## 2021-12-30 DIAGNOSIS — F41.9 ANXIETY: ICD-10-CM

## 2021-12-30 DIAGNOSIS — F40.01 PANIC DISORDER WITH AGORAPHOBIA AND MODERATE PANIC ATTACKS: Primary | ICD-10-CM

## 2021-12-30 PROCEDURE — 99213 OFFICE O/P EST LOW 20 MIN: CPT | Performed by: FAMILY MEDICINE

## 2021-12-30 RX ORDER — SERTRALINE HYDROCHLORIDE 25 MG/1
25 TABLET, FILM COATED ORAL 3 TIMES DAILY
Qty: 270 TABLET | Refills: 1 | Status: SHIPPED | OUTPATIENT
Start: 2021-12-30 | End: 2023-11-29

## 2021-12-30 NOTE — NURSING NOTE
Chief Complaint   Patient presents with     Recheck Medication     virtual visit, refill sertraline, takes 25MG TID, previously saw psych was private pay has left the practice     Pre-visit Screening:  Immunizations:  up to date  Colonoscopy:  is up to date  Mammogram: is up to date  Asthma Action Test/Plan:  NA  PHQ9:  Done today  GAD7:  Done today  Questioned patient about current smoking habits Pt. has never smoked.  Ok to leave detailed message on voice mail for today's visit only Yes, phone # [phone]

## 2022-02-19 ENCOUNTER — HEALTH MAINTENANCE LETTER (OUTPATIENT)
Age: 62
End: 2022-02-19

## 2022-03-07 ENCOUNTER — OFFICE VISIT (OUTPATIENT)
Dept: FAMILY MEDICINE | Facility: CLINIC | Age: 62
End: 2022-03-07

## 2022-03-07 VITALS
HEIGHT: 72 IN | OXYGEN SATURATION: 97 % | WEIGHT: 180.2 LBS | SYSTOLIC BLOOD PRESSURE: 120 MMHG | BODY MASS INDEX: 24.41 KG/M2 | DIASTOLIC BLOOD PRESSURE: 78 MMHG | HEART RATE: 71 BPM | TEMPERATURE: 97.6 F

## 2022-03-07 DIAGNOSIS — H81.10 BENIGN PAROXYSMAL POSITIONAL VERTIGO, UNSPECIFIED LATERALITY: Primary | ICD-10-CM

## 2022-03-07 PROCEDURE — 99214 OFFICE O/P EST MOD 30 MIN: CPT | Performed by: STUDENT IN AN ORGANIZED HEALTH CARE EDUCATION/TRAINING PROGRAM

## 2022-03-07 NOTE — NURSING NOTE
"Chief Complaint   Patient presents with     Dizziness     episode of vertigo a little over 2 weeks ago, was helping daughter with mello, noticed pain in neck and woke up with the room spinning, was nauseaus,  has had 3 episdose of this ever     Headache     headaches and \"pulling\" in eyes, pressure in back of neck, since the vertigo episode 2 weeks ago      Laceration     laceration to left lower leg near ankle, nicked vericose vein while shaving this morning     Pre-visit Screening:  Immunizations:  up to date  Colonoscopy:  is up to date  Mammogram: is up to date  Asthma Action Test/Plan:  NA  PHQ9:  NA  GAD7:  NA  Questioned patient about current smoking habits Pt. has never smoked.  Ok to leave detailed message on voice mail for today's visit only Yes, phone # [phone]      "

## 2022-03-07 NOTE — PROGRESS NOTES
"Assessment & Plan     1. Benign paroxysmal positional vertigo, unspecified laterality  Pt presenting with 3 recent episodes of vertigo, remote hx of BPPV and hx/exam most c/w this diagnosis again. Unlikely selective serotonin reuptake inhibitor side effect. Reasons to seeke emergent care incl s/sx of CVA reviewed. Pt will follow-up with [facility] for formal therapy.   - Physical Therapy Referral; Future     30 minutes spent on the date of the encounter doing chart review, history and exam, documentation and further activities per the note    Cam Gambino MD, [facility]       Subjective     Skyla Lopez is a 61 year old female who presents to clinic today for the following health issues:    HPI   Chief Complaint   Patient presents with     Dizziness     episode of vertigo a little over 2 weeks ago, was helping daughter with mello, noticed pain in neck and woke up with the room spinning, was nauseaus,  has had 3 episdose of this ever     Headache     headaches and \"pulling\" in eyes, pressure in back of neck, since the vertigo episode 2 weeks ago      Laceration     laceration to left lower leg near ankle, nicked vericose vein while shaving this morning      Dizziness  Onset/Duration: 3 episodes over last few weeks, lasting ~45 minutes. Feels eyes shifting horizontal   Description:   Do you feel faint: no  Does it feel like the surroundings (bed, room) are moving: YES  Unsteady/off balance: YES  Have you passed out or fallen: no  Progression of Symptoms: intermittent  Accompanying Signs & Symptoms:  Heart palpitations or chest pain: no  Nausea, vomiting: YES- nausea but no vomiting  Weakness or lack of coordination in arms or legs: no  Vision or speech changes: no  Numbness or tingling: no  Ringing in ears (Tinnitus): no  Hearing Loss: no  History:   Head trauma/concussion history: no  Previous similar symptoms: YES- 5 yrs ago  Recent bleeding history: no  Any new medications (BP?): " "no            Objective    /78 (BP Location: Right arm, Patient Position: Sitting, Cuff Size: Adult Regular)   Pulse 71   Temp 97.6  F (36.4  C) (Temporal)   Ht 1.88 m (6' 2\")   Wt 81.7 kg (180 lb 3.2 oz)   LMP 01/29/2014   SpO2 97%   BMI 23.14 kg/m    Body mass index is 23.14 kg/m .  Physical Exam   Alert, NAD  NC/AT  Sclerae anicteric, PERRL, EOMI with several beats of horizontal nystagmus to left and right  TMs wnl  RRR  Resp nonlabored  Skin warm and dry  No focal neuro deficits. Speech intact. Normal gait. Intact str/sens in all ext, no pronator drift  Appropriate affect         "

## 2022-03-07 NOTE — PATIENT INSTRUCTIONS
Can continue meclizine if needed    [facility]  [facility]  [address] [place] [address]  Ph: [phone]    One psychiatry group:   [facility]  [address]  [address]  [phone] - appt line

## 2022-03-14 ENCOUNTER — OFFICE VISIT (OUTPATIENT)
Dept: FAMILY MEDICINE | Facility: CLINIC | Age: 62
End: 2022-03-14

## 2022-03-14 VITALS
BODY MASS INDEX: 25.25 KG/M2 | SYSTOLIC BLOOD PRESSURE: 120 MMHG | TEMPERATURE: 98.1 F | DIASTOLIC BLOOD PRESSURE: 78 MMHG | HEART RATE: 68 BPM | HEIGHT: 72 IN | OXYGEN SATURATION: 98 % | WEIGHT: 186.4 LBS

## 2022-03-14 DIAGNOSIS — Z12.11 SCREEN FOR COLON CANCER: ICD-10-CM

## 2022-03-14 DIAGNOSIS — F41.9 ANXIETY: Primary | ICD-10-CM

## 2022-03-14 PROCEDURE — 99214 OFFICE O/P EST MOD 30 MIN: CPT | Performed by: FAMILY MEDICINE

## 2022-03-14 RX ORDER — CLONAZEPAM 0.5 MG/1
0.5 TABLET ORAL 2 TIMES DAILY PRN
Qty: 14 TABLET | Refills: 0 | Status: SHIPPED | OUTPATIENT
Start: 2022-03-14 | End: 2022-06-01

## 2022-03-14 ASSESSMENT — ANXIETY QUESTIONNAIRES
5. BEING SO RESTLESS THAT IT IS HARD TO SIT STILL: NOT AT ALL
6. BECOMING EASILY ANNOYED OR IRRITABLE: NOT AT ALL
GAD7 TOTAL SCORE: 5
1. FEELING NERVOUS, ANXIOUS, OR ON EDGE: NEARLY EVERY DAY
IF YOU CHECKED OFF ANY PROBLEMS ON THIS QUESTIONNAIRE, HOW DIFFICULT HAVE THESE PROBLEMS MADE IT FOR YOU TO DO YOUR WORK, TAKE CARE OF THINGS AT HOME, OR GET ALONG WITH OTHER PEOPLE: SOMEWHAT DIFFICULT
7. FEELING AFRAID AS IF SOMETHING AWFUL MIGHT HAPPEN: NOT AT ALL
2. NOT BEING ABLE TO STOP OR CONTROL WORRYING: SEVERAL DAYS
3. WORRYING TOO MUCH ABOUT DIFFERENT THINGS: NOT AT ALL

## 2022-03-14 ASSESSMENT — PATIENT HEALTH QUESTIONNAIRE - PHQ9
5. POOR APPETITE OR OVEREATING: SEVERAL DAYS
SUM OF ALL RESPONSES TO PHQ QUESTIONS 1-9: 4

## 2022-03-14 NOTE — PROGRESS NOTES
Assessment & Plan   Problem List Items Addressed This Visit     None      Visit Diagnoses     Anxiety    -  Primary    Relevant Medications    clonazePAM (KLONOPIN) 0.5 MG tablet    Screen for colon cancer        Relevant Orders    Adult Gastro Ref - Procedure Only           1. Anxiety  I discussed with her that I would give her a small amount of klonopin, use sparingly. She has an apt with psychiatry and she said she felt comfortable that this amount would get her through until she can see them and have a plan. Discussed risks and side effects. If she sees them regularly, they may also take over her sertraline.  - clonazePAM (KLONOPIN) 0.5 MG tablet; Take 1 tablet (0.5 mg) by mouth 2 times daily as needed for anxiety  Dispense: 14 tablet; Refill: 0    2. Screen for colon cancer    - Adult Gastro Ref - Procedure Only; Future           FUTURE APPOINTMENTS:       - Follow-up visit as needed.    No follow-ups on file.    Marguerite Knight MD  [facility]    Subjective     Nursing Notes:   Milly Leigh CMA  3/14/2022  2:26 PM  Signed  Chief Complaint   Patient presents with     Dizziness     recently seen for vertigo, [facility], was told vestibular mirgaine, had vestibular loss of right side, set up for PT      Anxiety     feeling very anxious, getting worked up easily, psychiatrist recently retired, used to be on clonazepam, is now on sertraline      Pre-visit Screening:  Immunizations:  up to date  Colonoscopy:  is due and ordered today  Mammogram: is up to date  Asthma Action Test/Plan:  NA  PHQ9:  Done today  GAD7:  Done today  Questioned patient about current smoking habits Pt. has never smoked.  Ok to leave detailed message on voice mail for today's visit only Yes, phone # [phone]           Skyla Lopez is a 61 year old female who presents to clinic today for the following health issues   HPI     Here with vertigo followup, saw Dr. Gambino last week and then [facility]  [facility]  Since then has been feeling off and anxious and weird. Is on sertraline and wanted me to take over the sertraline. Was previously seeing psychiatry. Changed over to me.  Her anxiety is body tense, headaches, can't sleep and shaky. Found some old paperwork from the psychiatrist, he retired.   Has an apt with new psychiatrist in May 9th. She was previously on klonazepam initially for several months. Was taking this twice a day. Then slowly introduced the sertraline. Then she slowly weaned off the klonopin. One other time was on this medication.   Now is hoping for this medication again. This worked really well to settle down her anxiety.   PDMP reviewed.        Review of Systems   Constitutional, HEENT, cardiovascular, pulmonary, gi and gu systems are negative, except as otherwise noted.      Objective    /78 (BP Location: Right arm, Patient Position: Sitting, Cuff Size: Adult Regular)   Pulse 68   Temp 98.1  F (36.7  C) (Temporal)   Ht 1.88 m (6' 2\")   Wt 84.6 kg (186 lb 6.4 oz)   LMP 01/29/2014   SpO2 98%   BMI 23.93 kg/m    Body mass index is 23.93 kg/m .  Physical Exam   GENERAL: healthy, alert and no distress  MS: no gross musculoskeletal defects noted, no edema  NEURO: Normal strength and tone, mentation intact and speech normal  PSYCH: mentation appears normal, affect normal/bright          "

## 2022-03-14 NOTE — NURSING NOTE
Chief Complaint   Patient presents with     Dizziness     recently seen for vertigo, [facility], was told vestibular mirgaine, had vestibular loss of right side, set up for PT      Anxiety     feeling very anxious, getting worked up easily, psychiatrist recently retired, used to be on clonazepam, is now on sertraline      Pre-visit Screening:  Immunizations:  up to date  Colonoscopy:  is due and ordered today  Mammogram: is up to date  Asthma Action Test/Plan:  NA  PHQ9:  Done today  GAD7:  Done today  Questioned patient about current smoking habits Pt. has never smoked.  Ok to leave detailed message on voice mail for today's visit only Yes, phone # [phone]

## 2022-03-15 ENCOUNTER — TELEPHONE (OUTPATIENT)
Dept: FAMILY MEDICINE | Facility: CLINIC | Age: 62
End: 2022-03-15

## 2022-03-15 ASSESSMENT — ANXIETY QUESTIONNAIRES: GAD7 TOTAL SCORE: 5

## 2022-03-15 NOTE — TELEPHONE ENCOUNTER
Received incoming faxed form from [facility] that requires review and signature from Dr. Gambino for Medicare certification form/PT evaluation.     Form can be faxed back to [phone]

## 2022-06-01 ENCOUNTER — OFFICE VISIT (OUTPATIENT)
Dept: FAMILY MEDICINE | Facility: CLINIC | Age: 62
End: 2022-06-01

## 2022-06-01 VITALS
WEIGHT: 181 LBS | BODY MASS INDEX: 24.52 KG/M2 | OXYGEN SATURATION: 98 % | HEIGHT: 72 IN | SYSTOLIC BLOOD PRESSURE: 118 MMHG | DIASTOLIC BLOOD PRESSURE: 78 MMHG | HEART RATE: 60 BPM | TEMPERATURE: 97.7 F

## 2022-06-01 DIAGNOSIS — F40.01 PANIC DISORDER WITH AGORAPHOBIA AND MODERATE PANIC ATTACKS: ICD-10-CM

## 2022-06-01 DIAGNOSIS — R00.2 PALPITATIONS: ICD-10-CM

## 2022-06-01 DIAGNOSIS — Z23 ENCOUNTER FOR VACCINATION: ICD-10-CM

## 2022-06-01 DIAGNOSIS — E78.00 HIGH CHOLESTEROL: Primary | ICD-10-CM

## 2022-06-01 DIAGNOSIS — K21.9 GASTROESOPHAGEAL REFLUX DISEASE WITHOUT ESOPHAGITIS: ICD-10-CM

## 2022-06-01 DIAGNOSIS — R10.13 EPIGASTRIC PAIN: ICD-10-CM

## 2022-06-01 DIAGNOSIS — F41.9 ANXIETY: ICD-10-CM

## 2022-06-01 PROCEDURE — 99214 OFFICE O/P EST MOD 30 MIN: CPT | Mod: 25 | Performed by: FAMILY MEDICINE

## 2022-06-01 PROCEDURE — 90471 IMMUNIZATION ADMIN: CPT | Performed by: FAMILY MEDICINE

## 2022-06-01 PROCEDURE — 90715 TDAP VACCINE 7 YRS/> IM: CPT | Performed by: FAMILY MEDICINE

## 2022-06-01 RX ORDER — ATENOLOL 25 MG/1
25 TABLET ORAL DAILY
Qty: 90 TABLET | Refills: 3 | Status: SHIPPED | OUTPATIENT
Start: 2022-06-01 | End: 2023-07-11

## 2022-06-01 RX ORDER — PANTOPRAZOLE SODIUM 20 MG/1
20 TABLET, DELAYED RELEASE ORAL DAILY
Qty: 90 TABLET | Refills: 3 | Status: SHIPPED | OUTPATIENT
Start: 2022-06-01 | End: 2023-11-29

## 2022-06-01 NOTE — PROGRESS NOTES
Assessment & Plan   Problem List Items Addressed This Visit        Behavioral    Panic disorder with agoraphobia and moderate panic attacks      Other Visit Diagnoses     High cholesterol    -  Primary    Relevant Orders    VENOUS COLLECTION    Lipid Panel ([facility])    Comprehensive Metobolic Panel ([facility])    Palpitations        Relevant Medications    atenolol (TENORMIN) 25 MG tablet    Anxiety        Gastroesophageal reflux disease without esophagitis        Relevant Medications    pantoprazole (PROTONIX) 20 MG EC tablet    Epigastric pain        Relevant Medications    pantoprazole (PROTONIX) 20 MG EC tablet    Encounter for vaccination               1. Palpitations  Refilled medications. Controlled.  - atenolol (TENORMIN) 25 MG tablet; Take 1 tablet (25 mg) by mouth daily  Dispense: 90 tablet; Refill: 3    2. Panic disorder with agoraphobia and moderate panic attacks  Followed by psychiatry.    3. Anxiety      4. Gastroesophageal reflux disease without esophagitis  Controlling her symptoms, refilled.  - pantoprazole (PROTONIX) 20 MG EC tablet; Take 1 tablet (20 mg) by mouth daily  Dispense: 90 tablet; Refill: 3    5. Epigastric pain    - pantoprazole (PROTONIX) 20 MG EC tablet; Take 1 tablet (20 mg) by mouth daily  Dispense: 90 tablet; Refill: 3    6. High cholesterol  Check labs. Discussed medications, side effect and monitoring. She is willing to go on a medication if still high.  - VENOUS COLLECTION; Future  - Lipid Panel ([facility]); Future  - Comprehensive Metobolic Panel ([facility]); Future    7. Encounter for vaccination             FUTURE APPOINTMENTS:       - Follow-up visit in 12 months.    No follow-ups on file.    Marguerite Knight MD  [facility]    Subjective     Nursing Notes:   Milly Leigh CMA  6/1/2022 10:01 AM  Addendum  Chief Complaint   Patient presents with     Recheck Medication     Non-fasting today, refill medications, would like fasting labs another day to recheck previous high  "cholesterol     Pre-visit Screening:  Immunizations:  not up to date - shingrix at pharmacy, td today  Colonoscopy:  is due and ordered   Mammogram: is up to date  Asthma Action Test/Plan:  NA  PHQ9:  Done today  GAD7:  DOne toay  Questioned patient about current smoking habits Pt. has never smoked.  Ok to leave detailed message on voice mail for today's visit only Yes, phone # [phone]           Skyla Lopez is a 61 year old female who presents to clinic today for the following health issues   HPI     Here to followup on her blood pressure and GERd  Due for labs and refills.  She had a high cholesterol and wants this rechecked. She is not fasting and will come back for labs.   Checking blood pressures at home. Its in normal range.  Taking pantoprazole. This controls her sx.  Sees psychiatry for the sertraline.  She is reluctant to go on medications for cholesterol. Dad has high cholesterol        Review of Systems   Constitutional, HEENT, cardiovascular, pulmonary, gi and gu systems are negative, except as otherwise noted.      Objective    /78 (BP Location: Left arm, Patient Position: Sitting, Cuff Size: Adult Regular)   Pulse 60   Temp 97.7  F (36.5  C) (Temporal)   Ht 1.88 m (6' 2\")   Wt 82.1 kg (181 lb)   LMP 01/29/2014   SpO2 98%   BMI 23.24 kg/m    Body mass index is 23.24 kg/m .  Physical Exam   GENERAL: healthy, alert and no distress  NECK: no adenopathy, no asymmetry, masses, or scars and thyroid normal to palpation  RESP: lungs clear to auscultation - no rales, rhonchi or wheezes  CV: regular rate and rhythm, normal S1 S2, no S3 or S4, no murmur, click or rub, no peripheral edema and peripheral pulses strong  ABDOMEN: soft, nontender, no hepatosplenomegaly, no masses and bowel sounds normal  MS: no gross musculoskeletal defects noted, no edema  NEURO: Normal strength and tone, mentation intact and speech normal  PSYCH: mentation appears normal, affect normal/bright    No results " found for any visits on 06/01/22.

## 2022-06-01 NOTE — NURSING NOTE
Chief Complaint   Patient presents with     Recheck Medication     Non-fasting today, refill medications, would like fasting labs another day to recheck previous high cholesterol     Pre-visit Screening:  Immunizations:  not up to date - shingrix at pharmacy, td today  Colonoscopy:  is due and ordered   Mammogram: is up to date  Asthma Action Test/Plan:  NA  PHQ9:  Done today  GAD7:  DOne toay  Questioned patient about current smoking habits Pt. has never smoked.  Ok to leave detailed message on voice mail for today's visit only Yes, phone # [phone]

## 2022-06-02 DIAGNOSIS — E78.00 HIGH CHOLESTEROL: ICD-10-CM

## 2022-06-02 LAB
ALBUMIN SERPL-MCNC: 4.3 G/DL (ref 3.6–5.1)
ALBUMIN/GLOB SERPL: 1.7 {RATIO} (ref 1–2.5)
ALP SERPL-CCNC: 49 U/L (ref 33–130)
ALT 1742-6: 11 U/L (ref 0–32)
AST 1920-8: 15 U/L (ref 0–35)
BILIRUB SERPL-MCNC: 0.7 MG/DL (ref 0.2–1.2)
BUN SERPL-MCNC: 14 MG/DL (ref 7–25)
BUN/CREATININE RATIO: 15.1 (ref 6–22)
CALCIUM SERPL-MCNC: 9.1 MG/DL (ref 8.6–10.3)
CHLORIDE SERPLBLD-SCNC: 103.5 MMOL/L (ref 98–110)
CHOLEST SERPL-MCNC: 222 MG/DL (ref 0–199)
CHOLEST/HDLC SERPL: 4 {RATIO} (ref 0–5)
CO2 SERPL-SCNC: 29.6 MMOL/L (ref 20–32)
CREAT SERPL-MCNC: 0.93 MG/DL (ref 0.6–1.3)
GLOBULIN, CALCULATED - QUEST: 2.6 (ref 1.9–3.7)
GLUCOSE SERPL-MCNC: 93 MG/DL (ref 60–99)
HDLC SERPL-MCNC: 54 MG/DL (ref 40–150)
LDLC SERPL CALC-MCNC: 142 MG/DL (ref 0–130)
POTASSIUM SERPL-SCNC: 4.11 MMOL/L (ref 3.5–5.3)
PROT SERPL-MCNC: 6.9 G/DL (ref 6.1–8.1)
SODIUM SERPL-SCNC: 140.2 MMOL/L (ref 135–146)
TRIGL SERPL-MCNC: 129 MG/DL (ref 0–149)

## 2022-06-02 PROCEDURE — 80053 COMPREHEN METABOLIC PANEL: CPT | Performed by: FAMILY MEDICINE

## 2022-06-02 PROCEDURE — 36415 COLL VENOUS BLD VENIPUNCTURE: CPT | Performed by: FAMILY MEDICINE

## 2022-06-02 PROCEDURE — 80061 LIPID PANEL: CPT | Performed by: FAMILY MEDICINE

## 2022-10-22 ENCOUNTER — HEALTH MAINTENANCE LETTER (OUTPATIENT)
Age: 62
End: 2022-10-22

## 2022-11-18 ENCOUNTER — HOSPITAL ENCOUNTER (EMERGENCY)
Facility: CLINIC | Age: 62
Discharge: HOME | End: 2022-11-18
Attending: NURSE PRACTITIONER | Admitting: NURSE PRACTITIONER
Payer: COMMERCIAL

## 2022-11-18 ENCOUNTER — OFFICE VISIT (OUTPATIENT)
Dept: FAMILY MEDICINE | Facility: CLINIC | Age: 62
End: 2022-11-18

## 2022-11-18 ENCOUNTER — LAB (OUTPATIENT)
Dept: LAB | Facility: CLINIC | Age: 62
End: 2022-11-18
Payer: COMMERCIAL

## 2022-11-18 ENCOUNTER — APPOINTMENT (OUTPATIENT)
Dept: CT IMAGING | Facility: CLINIC | Age: 62
End: 2022-11-18
Attending: EMERGENCY MEDICINE
Payer: COMMERCIAL

## 2022-11-18 VITALS
HEART RATE: 72 BPM | BODY MASS INDEX: 24.01 KG/M2 | WEIGHT: 187 LBS | DIASTOLIC BLOOD PRESSURE: 80 MMHG | SYSTOLIC BLOOD PRESSURE: 135 MMHG | OXYGEN SATURATION: 100 % | RESPIRATION RATE: 18 BRPM | TEMPERATURE: 98.6 F

## 2022-11-18 VITALS
SYSTOLIC BLOOD PRESSURE: 116 MMHG | BODY MASS INDEX: 25.35 KG/M2 | TEMPERATURE: 97.8 F | WEIGHT: 187.2 LBS | DIASTOLIC BLOOD PRESSURE: 74 MMHG | OXYGEN SATURATION: 98 % | HEIGHT: 72 IN | HEART RATE: 84 BPM

## 2022-11-18 DIAGNOSIS — E87.6 HYPOKALEMIA: ICD-10-CM

## 2022-11-18 DIAGNOSIS — R07.9 CHEST PAIN: ICD-10-CM

## 2022-11-18 DIAGNOSIS — R79.89 ELEVATED D-DIMER: ICD-10-CM

## 2022-11-18 DIAGNOSIS — R05.8 OTHER SPECIFIED COUGH: Primary | ICD-10-CM

## 2022-11-18 DIAGNOSIS — R07.89 CHEST DISCOMFORT: ICD-10-CM

## 2022-11-18 DIAGNOSIS — R07.89 CHEST WALL PAIN: ICD-10-CM

## 2022-11-18 DIAGNOSIS — U07.1 COVID-19 VIRUS INFECTION: ICD-10-CM

## 2022-11-18 DIAGNOSIS — E83.51 HYPOCALCEMIA: ICD-10-CM

## 2022-11-18 DIAGNOSIS — R05.9 COUGH: Primary | ICD-10-CM

## 2022-11-18 LAB
ALBUMIN SERPL-MCNC: 3.4 G/DL (ref 3.4–5)
ALP SERPL-CCNC: 54 U/L (ref 40–150)
ALT SERPL W P-5'-P-CCNC: 21 U/L (ref 0–50)
ANION GAP SERPL CALCULATED.3IONS-SCNC: 5 MMOL/L (ref 3–14)
AST SERPL W P-5'-P-CCNC: 14 U/L (ref 0–45)
BASOPHILS # BLD AUTO: 0.1 10E3/UL (ref 0–0.2)
BASOPHILS NFR BLD AUTO: 1 %
BILIRUB DIRECT SERPL-MCNC: <0.1 MG/DL (ref 0–0.2)
BILIRUB SERPL-MCNC: 0.2 MG/DL (ref 0.2–1.3)
BUN SERPL-MCNC: 20 MG/DL (ref 7–30)
CA-I BLD-MCNC: 4.3 MG/DL (ref 4.4–5.2)
CALCIUM SERPL-MCNC: 7.8 MG/DL (ref 8.5–10.1)
CHLORIDE BLD-SCNC: 111 MMOL/L (ref 94–109)
CO2 SERPL-SCNC: 25 MMOL/L (ref 20–32)
CREAT SERPL-MCNC: 0.62 MG/DL (ref 0.52–1.04)
D DIMER PPP FEU-MCNC: 0.66 UG/ML FEU (ref 0–0.5)
EOSINOPHIL # BLD AUTO: 0.2 10E3/UL (ref 0–0.7)
EOSINOPHIL NFR BLD AUTO: 3 %
ERYTHROCYTE [DISTWIDTH] IN BLOOD BY AUTOMATED COUNT: 12 % (ref 10–15)
GFR SERPL CREATININE-BSD FRML MDRD: >90 ML/MIN/1.73M2
GLUCOSE BLD-MCNC: 94 MG/DL (ref 70–99)
HCT VFR BLD AUTO: 37.2 % (ref 35–47)
HGB BLD-MCNC: 12.1 G/DL (ref 11.7–15.7)
IMM GRANULOCYTES # BLD: 0 10E3/UL
IMM GRANULOCYTES NFR BLD: 0 %
LYMPHOCYTES # BLD AUTO: 2 10E3/UL (ref 0.8–5.3)
LYMPHOCYTES NFR BLD AUTO: 29 %
MCH RBC QN AUTO: 30 PG (ref 26.5–33)
MCHC RBC AUTO-ENTMCNC: 32.5 G/DL (ref 31.5–36.5)
MCV RBC AUTO: 92 FL (ref 78–100)
MONOCYTES # BLD AUTO: 0.6 10E3/UL (ref 0–1.3)
MONOCYTES NFR BLD AUTO: 9 %
NEUTROPHILS # BLD AUTO: 4.1 10E3/UL (ref 1.6–8.3)
NEUTROPHILS NFR BLD AUTO: 58 %
NRBC # BLD AUTO: 0 10E3/UL
NRBC BLD AUTO-RTO: 0 /100
PLATELET # BLD AUTO: 352 10E3/UL (ref 150–450)
POTASSIUM BLD-SCNC: 3.2 MMOL/L (ref 3.4–5.3)
PROT SERPL-MCNC: 6.6 G/DL (ref 6.8–8.8)
RBC # BLD AUTO: 4.04 10E6/UL (ref 3.8–5.2)
SODIUM SERPL-SCNC: 141 MMOL/L (ref 133–144)
TROPONIN I SERPL HS-MCNC: 6 NG/L
WBC # BLD AUTO: 7 10E3/UL (ref 4–11)

## 2022-11-18 PROCEDURE — 82330 ASSAY OF CALCIUM: CPT | Performed by: NURSE PRACTITIONER

## 2022-11-18 PROCEDURE — 71275 CT ANGIOGRAPHY CHEST: CPT

## 2022-11-18 PROCEDURE — 82248 BILIRUBIN DIRECT: CPT | Performed by: NURSE PRACTITIONER

## 2022-11-18 PROCEDURE — 99215 OFFICE O/P EST HI 40 MIN: CPT | Performed by: FAMILY MEDICINE

## 2022-11-18 PROCEDURE — 99285 EMERGENCY DEPT VISIT HI MDM: CPT | Mod: 25

## 2022-11-18 PROCEDURE — 36415 COLL VENOUS BLD VENIPUNCTURE: CPT | Performed by: EMERGENCY MEDICINE

## 2022-11-18 PROCEDURE — 250N000009 HC RX 250: Performed by: EMERGENCY MEDICINE

## 2022-11-18 PROCEDURE — 250N000011 HC RX IP 250 OP 636: Performed by: EMERGENCY MEDICINE

## 2022-11-18 PROCEDURE — 80053 COMPREHEN METABOLIC PANEL: CPT | Performed by: EMERGENCY MEDICINE

## 2022-11-18 PROCEDURE — 85379 FIBRIN DEGRADATION QUANT: CPT

## 2022-11-18 PROCEDURE — 84484 ASSAY OF TROPONIN QUANT: CPT | Performed by: EMERGENCY MEDICINE

## 2022-11-18 PROCEDURE — 36415 COLL VENOUS BLD VENIPUNCTURE: CPT

## 2022-11-18 PROCEDURE — 85025 COMPLETE CBC W/AUTO DIFF WBC: CPT | Performed by: EMERGENCY MEDICINE

## 2022-11-18 PROCEDURE — 36415 COLL VENOUS BLD VENIPUNCTURE: CPT | Performed by: NURSE PRACTITIONER

## 2022-11-18 RX ORDER — IOPAMIDOL 755 MG/ML
70 INJECTION, SOLUTION INTRAVASCULAR ONCE
Status: COMPLETED | OUTPATIENT
Start: 2022-11-18 | End: 2022-11-18

## 2022-11-18 RX ADMIN — SODIUM CHLORIDE 94 ML: 900 INJECTION INTRAVENOUS at 21:42

## 2022-11-18 RX ADMIN — IOPAMIDOL 70 ML: 755 INJECTION, SOLUTION INTRAVENOUS at 21:42

## 2022-11-18 NOTE — ED TRIAGE NOTES
Patient referred to the ED to be evaluated for a blood clot.      Triage Assessment     Row Name 11/18/22 5152       Triage Assessment (Adult)    Airway WDL WDL       Respiratory WDL    Respiratory WDL X;cough       Skin Circulation/Temperature WDL    Skin Circulation/Temperature WDL WDL       Cardiac WDL    Cardiac WDL WDL       Peripheral/Neurovascular WDL    Peripheral Neurovascular WDL WDL       Cognitive/Neuro/Behavioral WDL    Cognitive/Neuro/Behavioral WDL WDL

## 2022-11-18 NOTE — ED PROVIDER NOTES
Rapid Assessment Note    History:   Skyla Lopez is a 62 year old female who presents with concern for a blood clot. Patient reports she was COVID+ 2 weeks ago and has had a cough ever since, but overall felt she was improving. Over the last 2 days, she has developed lower bilateral back pain and L flank pain, worse with coughing. She visited [facility] and had a D-Dimer which came back elevated. Here for a CT to rule out a clot. Denies shortness of breath, edema, calf pain. No estrogen use or smoking.     Exam:   Eyes:               Sclera white; Pupils are equal and round  ENT:                External ears and nares normal  CV:                  Rate as above with regular rhythm   Resp:               Breath sounds clear and equal bilaterally                          Non-labored, no retractions or accessory muscle use  MS:                  Moves all extremities  Skin:                Warm and dry  Neuro:             Speech is normal and fluent. No apparent deficit.    Plan of Care:   I evaluated the patient and developed an initial plan of care. I discussed this plan and explained that I, or one of my partners, would be returning to complete the evaluation.     CT PE and additional blood work ordered.      I, DK MADRID, am serving as a scribe to document services personally performed by Dr. Silvestre MD based on my observations and the provider's statements to me.    11/18/2022  [facility]    Portions of this medical record were completed by a scribe. UPON MY REVIEW AND AUTHENTICATION BY ELECTRONIC SIGNATURE, this confirms (a) I performed the applicable clinical services, and (b) the record is accurate.          Abena Rivers MD  11/18/22 8149

## 2022-11-18 NOTE — NURSING NOTE
Chief Complaint   Patient presents with     Cough     Cough started 15 days ago, having some back and rib pain from coughing, not productive, fatigue, some nasal drainage, was COVID pos on 11/03     Pre-visit Screening:  Immunizations:  up to date  Colonoscopy:  is up to date  Mammogram: is up to date  Asthma Action Test/Plan:  NA  PHQ9:  NA  GAD7:  NA  Questioned patient about current smoking habits Pt. has never smoked.  Ok to leave detailed message on voice mail for today's visit only Yes, phone # [phone]

## 2022-11-18 NOTE — PROGRESS NOTES
Assessment & Plan   Problem List Items Addressed This Visit    None  Visit Diagnoses     Other specified cough    -  Primary    COVID-19 virus infection             1. Other specified cough  Labs done.     2. COVID-19 virus infection  Recent infection      3. Chest discomfort  Due to recheck infection and having chest discomfort, I checked a d dimer. This was elevated, so I sent her to the ER for additional evaluation and to rule out PE.         FUTURE APPOINTMENTS:       - Follow-up visit per ER evaluation.    No follow-ups on file.    Marguerite Knight MD  [facility]    Subjective     Nursing Notes:   Milly Leigh CMA  11/18/2022  2:28 PM  Signed  Chief Complaint   Patient presents with     Cough     Cough started 15 days ago, having some back and rib pain from coughing, not productive, fatigue, some nasal drainage, was COVID pos on 11/03     Pre-visit Screening:  Immunizations:  up to date  Colonoscopy:  is up to date  Mammogram: is up to date  Asthma Action Test/Plan:  NA  PHQ9:  NA  GAD7:  NA  Questioned patient about current smoking habits Pt. has never smoked.  Ok to leave detailed message on voice mail for today's visit only Yes, phone # [phone]           Skyla Lopez is a 62 year old female who presents to clinic today for the following health issues   HPI     Had covid + November 3rd. Had cough and fever.  Has some pain into the chest. Still coughing however. Cough is getting better. Slight phlegm.   Pain into the mid back area and wrapped.   Tender to palpation left flank area. Slight pain with a deep breath, more with a cough. Fatigue. Not sure if she is winded.  Fatigue overall.  Had 3 covid shots.        Review of Systems   Constitutional, HEENT, cardiovascular, pulmonary, gi and gu systems are negative, except as otherwise noted.      Objective    /74 (BP Location: Left arm, Patient Position: Sitting, Cuff Size: Adult Regular)   Pulse 84   Temp 97.8  F (36.6  " C) (Temporal)   Ht 1.88 m (6' 2\")   Wt 84.9 kg (187 lb 3.2 oz)   LMP 01/29/2014   SpO2 98%   BMI 24.04 kg/m    Body mass index is 24.04 kg/m .  Physical Exam   GENERAL: healthy, alert and no distress  HENT: ear canals and TM's normal, nose and mouth without ulcers or lesions  NECK: no adenopathy, no asymmetry, masses, or scars and thyroid normal to palpation  RESP: lungs clear to auscultation - no rales, rhonchi or wheezes  CV: regular rate and rhythm, normal S1 S2, no S3 or S4, no murmur, click or rub, no peripheral edema and peripheral pulses strong  ABDOMEN: soft, nontender, no hepatosplenomegaly, no masses and bowel sounds normal  MS: no gross musculoskeletal defects noted, no edema  NEURO: Normal strength and tone, mentation intact and speech normal  PSYCH: mentation appears normal, affect normal/bright    Results for orders placed or performed during the hospital encounter of 11/18/22   CT Chest Pulmonary Embolism w Contrast     Status: None    Narrative    EXAM: CT CHEST PULMONARY EMBOLISM W CONTRAST  LOCATION: [facility]  DATE/TIME: 11/18/2022 9:52 PM    INDICATION: recent covid, now pain, elev ddimer  COMPARISON: None available.  TECHNIQUE: CT chest pulmonary angiogram during arterial phase injection of IV contrast. Multiplanar reformats and MIP reconstructions were performed. Dose reduction techniques were used.   CONTRAST: 70 mL Isovue 370    FINDINGS:  ANGIOGRAM CHEST: Pulmonary arteries are normal caliber and negative for pulmonary emboli. Thoracic aorta is negative for dissection. No CT evidence of right heart strain.    LUNGS AND PLEURA: No focal airspace infiltrate. No pneumothorax or pleural effusion.    MEDIASTINUM/AXILLAE: Heart size is normal. No pericardial effusion. No adenopathy.    CORONARY ARTERY CALCIFICATION: None.    UPPER ABDOMEN: No acute findings.    MUSCULOSKELETAL: No acute osseous findings.      Impression    IMPRESSION:  1.  Negative for pulmonary " embolism.    2.  No evidence of pneumonia or pulmonary edema.   Basic metabolic panel     Status: Abnormal   Result Value Ref Range    Sodium 141 133 - 144 mmol/L    Potassium 3.2 (L) 3.4 - 5.3 mmol/L    Chloride 111 (H) 94 - 109 mmol/L    Carbon Dioxide (CO2) 25 20 - 32 mmol/L    Anion Gap 5 3 - 14 mmol/L    Urea Nitrogen 20 7 - 30 mg/dL    Creatinine 0.62 0.52 - 1.04 mg/dL    Calcium 7.8 (L) 8.5 - 10.1 mg/dL    Glucose 94 70 - 99 mg/dL    GFR Estimate >90 >60 mL/min/1.73m2   Troponin I     Status: Normal   Result Value Ref Range    Troponin I High Sensitivity 6 <54 ng/L   CBC with platelets and differential     Status: None   Result Value Ref Range    WBC Count 7.0 4.0 - 11.0 10e3/uL    RBC Count 4.04 3.80 - 5.20 10e6/uL    Hemoglobin 12.1 11.7 - 15.7 g/dL    Hematocrit 37.2 35.0 - 47.0 %    MCV 92 78 - 100 fL    MCH 30.0 26.5 - 33.0 pg    MCHC 32.5 31.5 - 36.5 g/dL    RDW 12.0 10.0 - 15.0 %    Platelet Count 352 150 - 450 10e3/uL    % Neutrophils 58 %    % Lymphocytes 29 %    % Monocytes 9 %    % Eosinophils 3 %    % Basophils 1 %    % Immature Granulocytes 0 %    NRBCs per 100 WBC 0 <1 /100    Absolute Neutrophils 4.1 1.6 - 8.3 10e3/uL    Absolute Lymphocytes 2.0 0.8 - 5.3 10e3/uL    Absolute Monocytes 0.6 0.0 - 1.3 10e3/uL    Absolute Eosinophils 0.2 0.0 - 0.7 10e3/uL    Absolute Basophils 0.1 0.0 - 0.2 10e3/uL    Absolute Immature Granulocytes 0.0 <=0.4 10e3/uL    Absolute NRBCs 0.0 10e3/uL   Ionized Calcium     Status: Abnormal   Result Value Ref Range    Calcium Ionized 4.3 (L) 4.4 - 5.2 mg/dL   Hepatic panel     Status: Abnormal   Result Value Ref Range    Bilirubin Total 0.2 0.2 - 1.3 mg/dL    Bilirubin Direct <0.1 0.0 - 0.2 mg/dL    Protein Total 6.6 (L) 6.8 - 8.8 g/dL    Albumin 3.4 3.4 - 5.0 g/dL    Alkaline Phosphatase 54 40 - 150 U/L    AST 14 0 - 45 U/L    ALT 21 0 - 50 U/L   CBC with platelets differential     Status: None    Narrative    The following orders were created for panel order CBC with  platelets differential.  Procedure                               Abnormality         Status                     ---------                               -----------         ------                     CBC with platelets and d...[[id number]]                      Final result                 Please view results for these tests on the individual orders.   Results for orders placed or performed in visit on 11/18/22   D dimer quantitative     Status: Abnormal   Result Value Ref Range    D-Dimer Quantitative 0.66 (H) 0.00 - 0.50 ug/mL FEU    Narrative    This D-dimer assay is intended for use in conjunction with a clinical pretest probability assessment model to exclude pulmonary embolism (PE) and deep venous thrombosis (DVT) in outpatients suspected of PE or DVT. The cut-off value is 0.50 ug/mL FEU.

## 2022-11-19 ASSESSMENT — ENCOUNTER SYMPTOMS
FEVER: 0
CHEST TIGHTNESS: 0
SORE THROAT: 0
COUGH: 1
WHEEZING: 0
STRIDOR: 0
ABDOMINAL PAIN: 0
FATIGUE: 0
CHILLS: 0
SHORTNESS OF BREATH: 0

## 2022-11-19 NOTE — ED PROVIDER NOTES
History     Chief Complaint:  Abnormal Labs       HPI   Skyla Lopez is a 62 year old female who presents with pleuritic chest wall pain.  The patient was diagnosed with COVID approximately 2 weeks ago and has had a persistent cough since that time.  She did feel she was improving but over the last 2 days has had bilateral thoracic back pain worsening with coughing.  She was seen at [facility] and had a D-dimer which returned elevated and therefore presents to the ED for CT to rule out pulmonary embolism.  The patient denies shortness of breath, fever, upper or lower extremity edema, calf pain, history of estrogen use, smoking.  Of note other family members also had COVID but have since improved.    ROS:  Review of Systems   Constitutional: Negative for chills, fatigue and fever.   HENT: Positive for congestion. Negative for sore throat.    Respiratory: Positive for cough. Negative for chest tightness, shortness of breath, wheezing and stridor.    Cardiovascular: Negative for chest pain.   Gastrointestinal: Negative for abdominal pain.   All other systems reviewed and are negative.    Allergies:  Celexa [Citalopram]  Metoclopramide Hcl  Penicillin G     Medications:    Ascorbic Acid (VITAMIN C) 500 MG CAPS  atenolol (TENORMIN) 25 MG tablet  fish oil-omega-3 fatty acids 1000 MG capsule  Lactobacillus (PROBIOTIC ACIDOPHILUS) CAPS  Multiple Vitamins-Minerals (MULTIVITAMIN ADULT PO)  pantoprazole (PROTONIX) 20 MG EC tablet  sertraline (ZOLOFT) 25 MG tablet  Vitamin D, Cholecalciferol, 1000 units CAPS        Past Medical History:    Past Medical History:   Diagnosis Date     Endometrial polyp 5/29/2015     Generalized anxiety disorder 1993     Palpitations      PONV (postoperative nausea and vomiting)      Tachycardia, unspecified        Past Surgical History:    Past Surgical History:   Procedure Laterality Date     APPENDECTOMY       CRYOPRESERVATION REPRODUCTIVE TISSUE, OVARIAN  1982    left  ovary removed/borderline tumor     DILATION AND CURETTAGE, OPERATIVE HYSTEROSCOPY WITH MORCELLATOR, COMBINED N/A 6/4/2015    Procedure: COMBINED DILATION AND CURETTAGE, OPERATIVE HYSTEROSCOPY WITH MORCELLATOR;  Surgeon: Hamida Prakash MD;  Location: [facility] REMOVAL OF TONSILS,12+ Y/O  age 23    Tonsils 12+y.o.     LAPAROSCOPIC FUNDOPLASTY  1990/92/93    endometriosis        Family History:    family history includes Breast Cancer in her paternal aunt; C.A.D. in her father; Lipids in her father; Neurologic Disorder in her father; Prostate Cancer in her father.    Social History:   reports that she has never smoked. She has never used smokeless tobacco. She reports that she does not drink alcohol and does not use drugs.  PCP: Marguerite Knight     Physical Exam     Patient Vitals for the past 24 hrs:   BP Temp Temp src Pulse Resp SpO2 Weight   11/18/22 2245 135/80 -- -- 72 18 -- --   11/18/22 1753 (!) 159/79 98.6  F (37  C) Temporal 75 16 100 % 84.8 kg (187 lb)        Physical Exam  Nursing notes reviewed. Vitals reviewed.  General: Alert. Well kept.  Eyes:  Conjunctiva non-injected, non-icteric.  Neck/Throat: Moist mucous membranes.  Normal voice.  Cardiac: Regular rhythm. Normal heart sounds with no murmur/rubs/click.   Pulmonary: Clear and equal breath sounds bilaterally. No crackles/rales. No wheezing  Abdomen: Soft. Non-distended. Non-tender to palpation. No masses. No guarding or rebound.  Musculoskeletal: Normal gross range of motion of all 4 extremities.    Neurological: Alert and oriented x4.   Skin: Warm and dry without rashes or petechiae. Normal appearance of visualized exposed skin.  Psych: Affect normal. Good eye contact.      Emergency Department Course   ECG:  No results found for this or any previous visit.    Imaging:  CT Chest Pulmonary Embolism w Contrast   Final Result   IMPRESSION:   1.  Negative for pulmonary embolism.      2.  No evidence of pneumonia or pulmonary edema.          Report per radiology    Laboratory:  Labs Ordered and Resulted from Time of ED Arrival to Time of ED Departure   BASIC METABOLIC PANEL - Abnormal       Result Value    Sodium 141      Potassium 3.2 (*)     Chloride 111 (*)     Carbon Dioxide (CO2) 25      Anion Gap 5      Urea Nitrogen 20      Creatinine 0.62      Calcium 7.8 (*)     Glucose 94      GFR Estimate >90     IONIZED CALCIUM - Abnormal    Calcium Ionized 4.3 (*)    HEPATIC FUNCTION PANEL - Abnormal    Bilirubin Total 0.2      Bilirubin Direct <0.1      Protein Total 6.6 (*)     Albumin 3.4      Alkaline Phosphatase 54      AST 14      ALT 21     TROPONIN I - Normal    Troponin I High Sensitivity 6     CBC WITH PLATELETS AND DIFFERENTIAL    WBC Count 7.0      RBC Count 4.04      Hemoglobin 12.1      Hematocrit 37.2      MCV 92      MCH 30.0      MCHC 32.5      RDW 12.0      Platelet Count 352      % Neutrophils 58      % Lymphocytes 29      % Monocytes 9      % Eosinophils 3      % Basophils 1      % Immature Granulocytes 0      NRBCs per 100 WBC 0      Absolute Neutrophils 4.1      Absolute Lymphocytes 2.0      Absolute Monocytes 0.6      Absolute Eosinophils 0.2      Absolute Basophils 0.1      Absolute Immature Granulocytes 0.0      Absolute NRBCs 0.0        Emergency Department Course:  Reviewed:  I reviewed nursing notes, vitals and past medical history    Assessments:  2207 I obtained history and examined the patient as noted above.   2300 I rechecked the patient and explained findings.     Interventions:  Medications   iopamidol (ISOVUE-370) solution 70 mL (70 mLs Intravenous Given 11/18/22 2142)   Saline Flush (94 mLs Intravenous Given 11/18/22 2142)       Disposition:  The patient was discharged to home.     Impression & Plan      Medical Decision Making:  Skyla Lopez is a 62 year old female who presents with pleuritic chest wall pain in the setting of recent COVID and persistent coughing.  D-dimer at outside facility was elevated and  therefore patient presents to the ED for CT imaging which thankfully returns negative for pulmonary embolism, dissection, pneumonia, pneumothorax.  She has no fever or leukocytosis.  Patient denies chest pain and has a negative troponin making acute coronary syndrome.  Lab work today does show an incidental hypocalcemia of 7.8.  She also has a hypokalemia of 3.2 and low albumin of 3.4.  Ionized calcium was obtained and returned just below the normal level at 4.3.  Patient does note poor intake over the last 2 weeks with her illness and this is likely the cause of her hypocalcemia.  Patient was given dietary and supplement instructions which she will use over the weekend and will have close follow-up with primary care early next week for recheck of her basic metabolic panel.  Patient will use ibuprofen and/or acetaminophen for discomfort.  I do not suspect referred pain from an intra-abdominal or urologic process.  The patient will return to the ED with any worsening pain, shortness of breath, fever, weakness, concerns.    Diagnosis:    ICD-10-CM    1. Elevated d-dimer  R79.89       2. Hypocalcemia  E83.51       3. Hypokalemia  E87.6       4. Chest wall pain  R07.89            Discharge Medications:  Discharge Medication List as of 11/18/2022 10:57 PM           11/18/2022   Citlaly Brunswick, DNP       Brunswick, Citlaly, CNP  11/19/22 0018

## 2022-12-02 ENCOUNTER — OFFICE VISIT (OUTPATIENT)
Dept: FAMILY MEDICINE | Facility: CLINIC | Age: 62
End: 2022-12-02

## 2022-12-02 VITALS
HEIGHT: 72 IN | HEART RATE: 71 BPM | WEIGHT: 187 LBS | BODY MASS INDEX: 25.33 KG/M2 | DIASTOLIC BLOOD PRESSURE: 82 MMHG | OXYGEN SATURATION: 96 % | TEMPERATURE: 98.6 F | SYSTOLIC BLOOD PRESSURE: 134 MMHG

## 2022-12-02 DIAGNOSIS — R05.8 OTHER SPECIFIED COUGH: ICD-10-CM

## 2022-12-02 DIAGNOSIS — E83.51 HYPOCALCEMIA: Primary | ICD-10-CM

## 2022-12-02 DIAGNOSIS — Z23 ENCOUNTER FOR VACCINATION: ICD-10-CM

## 2022-12-02 LAB
BUN SERPL-MCNC: 21 MG/DL (ref 7–25)
BUN/CREATININE RATIO: 20.4 (ref 6–22)
CALCIUM SERPL-MCNC: 9.3 MG/DL (ref 8.6–10.3)
CHLORIDE SERPLBLD-SCNC: 101.6 MMOL/L (ref 98–110)
CO2 SERPL-SCNC: 27.8 MMOL/L (ref 20–32)
CREAT SERPL-MCNC: 1.03 MG/DL (ref 0.6–1.3)
GLUCOSE SERPL-MCNC: 97 MG/DL (ref 60–99)
POTASSIUM SERPL-SCNC: 4.3 MMOL/L (ref 3.5–5.3)
SODIUM SERPL-SCNC: 138.1 MMOL/L (ref 135–146)

## 2022-12-02 PROCEDURE — 99214 OFFICE O/P EST MOD 30 MIN: CPT | Mod: 25 | Performed by: FAMILY MEDICINE

## 2022-12-02 PROCEDURE — 90471 IMMUNIZATION ADMIN: CPT | Performed by: FAMILY MEDICINE

## 2022-12-02 PROCEDURE — 36415 COLL VENOUS BLD VENIPUNCTURE: CPT | Performed by: FAMILY MEDICINE

## 2022-12-02 PROCEDURE — 80048 BASIC METABOLIC PNL TOTAL CA: CPT | Performed by: FAMILY MEDICINE

## 2022-12-02 PROCEDURE — 90686 IIV4 VACC NO PRSV 0.5 ML IM: CPT | Performed by: FAMILY MEDICINE

## 2022-12-02 RX ORDER — ALBUTEROL SULFATE 90 UG/1
2 AEROSOL, METERED RESPIRATORY (INHALATION) EVERY 6 HOURS PRN
Qty: 18 G | Refills: 1 | Status: SHIPPED | OUTPATIENT
Start: 2022-12-02 | End: 2023-07-21

## 2022-12-02 NOTE — PROGRESS NOTES
Assessment & Plan   Problem List Items Addressed This Visit    None  Visit Diagnoses     Hypocalcemia    -  Primary    Relevant Orders    VENOUS COLLECTION (Completed)    Basic Metabolic Panel (BFP) (Completed)    Encounter for vaccination        Relevant Orders    FLU VAC PRESRV FREE QUAD SPLIT VIR 3+YRS IM (Completed)    Other specified cough        Relevant Medications    albuterol (PROAIR HFA/PROVENTIL HFA/VENTOLIN HFA) 108 (90 Base) MCG/ACT inhaler             1. Hypocalcemia  Recheck today. In normal range now.  - VENOUS COLLECTION  - Basic Metabolic Panel (BFP)    2. Encounter for vaccination    - FLU VAC PRESRV FREE QUAD SPLIT VIR 3+YRS IM    3. Other specified cough  This is improving. Albuterol inhaler as needed.  - albuterol (PROAIR HFA/PROVENTIL HFA/VENTOLIN HFA) 108 (90 Base) MCG/ACT inhaler; Inhale 2 puffs into the lungs every 6 hours as needed for shortness of breath / dyspnea or wheezing  Dispense: 18 g; Refill: 1    No follow-ups on file.   followup as needed.    Marguerite Knight MD  [facility]    Nakul Rodriguez is a 62 year old, presenting for the following health issues:  ER F/U (Went to [facility] on 11/18/22 for elevated d-dimer, ct was normal, still having cough with back/ rib pain, recheck abnormal labs from ED )      HPI     ED/UC Followup:    Facility:  [facility]  Date of visit: 11/18/22  Reason for visit: elevated D- Dimer   Current Status: still having back pain from coughing    was in the ER with chest discomfort, recent covid infection and + d dimer.    She is getting her energy back, still has the mid back area pain,has a lingering cough.   Now has some postnasal drainage.  D dimer was + but then ct normal.  The cough is still there but is better.  d    Review of Systems   Constitutional, HEENT, cardiovascular, pulmonary, gi and gu systems are negative, except as otherwise noted.      Objective    /82 (BP Location: Left arm, Patient Position: Sitting,  "Cuff Size: Adult Regular)   Pulse 71   Temp 98.6  F (37  C) (Temporal)   Ht 1.88 m (6' 2\")   Wt 84.8 kg (187 lb)   LMP 01/29/2014   SpO2 96%   BMI 24.01 kg/m    Body mass index is 24.01 kg/m .  Physical Exam   GENERAL: healthy, alert and no distress  MS: no gross musculoskeletal defects noted, no edema  NEURO: Normal strength and tone, mentation intact and speech normal  PSYCH: mentation appears normal, affect normal/bright    Results for orders placed or performed in visit on 12/02/22   Basic Metabolic Panel (BFP)     Status: None   Result Value Ref Range    Carbon Dioxide 27.8 20 - 32 mmol/L    Creatinine 1.03 0.60 - 1.30 mg/dL    Glucose 97 60 - 99 mg/dL    Sodium 138.1 135 - 146 mmol/L    Potassium 4.30 3.5 - 5.3 mmol/L    Chloride 101.6 98 - 110 mmol/L    Urea Nitrogen 21 7 - 25 mg/dL    Calcium 9.3 8.6 - 10.3 mg/dL    BUN/Creatinine Ratio 20.4 6 - 22                     "

## 2023-02-16 ENCOUNTER — HOSPITAL ENCOUNTER (OUTPATIENT)
Dept: MAMMOGRAPHY | Facility: CLINIC | Age: 63
Discharge: HOME | End: 2023-02-16
Attending: FAMILY MEDICINE | Admitting: FAMILY MEDICINE
Payer: COMMERCIAL

## 2023-02-16 DIAGNOSIS — Z12.31 VISIT FOR SCREENING MAMMOGRAM: ICD-10-CM

## 2023-02-16 PROCEDURE — 77067 SCR MAMMO BI INCL CAD: CPT

## 2023-07-11 DIAGNOSIS — R00.2 PALPITATIONS: ICD-10-CM

## 2023-07-11 NOTE — TELEPHONE ENCOUNTER
Pt scheduled appt for 07/21. Needs short supply of her atenolol sent in.    Skyla Lopez is requesting a refill of:    Pending Prescriptions:                       Disp   Refills    atenolol (TENORMIN) 25 MG tablet          14 tab*0            Sig: Take 1 tablet (25 mg) by mouth daily

## 2023-07-12 RX ORDER — ATENOLOL 25 MG/1
25 TABLET ORAL DAILY
Qty: 14 TABLET | Refills: 0 | Status: SHIPPED | OUTPATIENT
Start: 2023-07-12 | End: 2023-07-21

## 2023-07-20 NOTE — PROGRESS NOTES
Assessment & Plan   Problem List Items Addressed This Visit    None  Visit Diagnoses     Palpitations        Relevant Medications    atenolol (TENORMIN) 25 MG tablet    Other Relevant Orders    VENOUS COLLECTION    Lipid Panel (BFP)    Basic Metabolic Panel (BFP)    Gastroesophageal reflux disease without esophagitis        Epigastric pain             1. Palpitations  Check labs, continue medications. Come in for fasting labs.  - atenolol (TENORMIN) 25 MG tablet; Take 1 tablet (25 mg) by mouth daily  Dispense: 90 tablet; Refill: 3  - VENOUS COLLECTION; Future  - Lipid Panel (BFP); Future  - Basic Metabolic Panel (BFP); Future    2. Gastroesophageal reflux disease without esophagitis  She said she has enough of the medications. Doesn't take it regularly.    3. Epigastric pain               FUTURE APPOINTMENTS:       - Follow-up visit in 12 mo    No follow-ups on file.    Marguerite Knight MD  [facility]    Subjective     Nursing Notes:   Milly Leigh, [facility]  7/21/2023  9:34 AM  Signed  Chief Complaint   Patient presents with     Recheck Medication     Refill medications, she would like to come back for fasting labs      Pre-visit Screening:  Immunizations:  not up to date - shingrix at pharmacy  Colonoscopy:  Pt declines  Mammogram: is up to date  Asthma Action Test/Plan:  Na  PHQ9:  NA  GAD7:  NA  Questioned patient about current smoking habits Pt. has never smoked.  Ok to leave detailed message on voice mail for today's visit only Yes, phone # [phone]           Skyla Lopez is a 62 year old female who presents to clinic today for the following health issues   HPI     Here to followup on his atenolol, she is doing well on the medications.  Will come back for fasting labs. Sees gynecology for gyn related healthcare.        Review of Systems   Constitutional, HEENT, cardiovascular, pulmonary, gi and gu systems are negative, except as otherwise noted.      Objective    /74 (BP  "Location: Right arm, Patient Position: Sitting, Cuff Size: Adult Regular)   Pulse 60   Temp 98.4  F (36.9  C) (Temporal)   Ht 1.88 m (6' 2\")   Wt 81.6 kg (180 lb)   LMP 01/01/2014 (Approximate)   SpO2 99%   BMI 23.11 kg/m    Body mass index is 23.11 kg/m .  Physical Exam   GENERAL: healthy, alert and no distress  RESP: lungs clear to auscultation - no rales, rhonchi or wheezes  CV: regular rate and rhythm, normal S1 S2, no S3 or S4, no murmur, click or rub, no peripheral edema and peripheral pulses strong  MS: no gross musculoskeletal defects noted, no edema  NEURO: Normal strength and tone, mentation intact and speech normal  PSYCH: mentation appears normal, affect normal/bright    No results found for any visits on 07/21/23.      "

## 2023-07-21 ENCOUNTER — OFFICE VISIT (OUTPATIENT)
Dept: FAMILY MEDICINE | Facility: CLINIC | Age: 63
End: 2023-07-21

## 2023-07-21 VITALS
BODY MASS INDEX: 24.38 KG/M2 | WEIGHT: 180 LBS | SYSTOLIC BLOOD PRESSURE: 128 MMHG | TEMPERATURE: 98.4 F | DIASTOLIC BLOOD PRESSURE: 74 MMHG | OXYGEN SATURATION: 99 % | HEIGHT: 72 IN | HEART RATE: 60 BPM

## 2023-07-21 DIAGNOSIS — K21.9 GASTROESOPHAGEAL REFLUX DISEASE WITHOUT ESOPHAGITIS: ICD-10-CM

## 2023-07-21 DIAGNOSIS — R10.13 EPIGASTRIC PAIN: ICD-10-CM

## 2023-07-21 DIAGNOSIS — R00.2 PALPITATIONS: ICD-10-CM

## 2023-07-21 PROCEDURE — 99213 OFFICE O/P EST LOW 20 MIN: CPT | Performed by: FAMILY MEDICINE

## 2023-07-21 RX ORDER — ATENOLOL 25 MG/1
25 TABLET ORAL DAILY
Qty: 90 TABLET | Refills: 3 | Status: SHIPPED | OUTPATIENT
Start: 2023-07-21 | End: 2024-06-10

## 2023-07-27 DIAGNOSIS — R00.2 PALPITATIONS: ICD-10-CM

## 2023-07-27 LAB
BUN SERPL-MCNC: 19 MG/DL (ref 7–25)
BUN/CREATININE RATIO: 20 (ref 6–32)
CALCIUM SERPL-MCNC: 9.4 MG/DL (ref 8.6–10.3)
CHLORIDE SERPLBLD-SCNC: 105.5 MMOL/L (ref 98–110)
CHOLEST SERPL-MCNC: 231 MG/DL (ref 0–199)
CHOLEST/HDLC SERPL: 4 {RATIO} (ref 0–5)
CO2 SERPL-SCNC: 29.9 MMOL/L (ref 20–32)
CREAT SERPL-MCNC: 0.95 MG/DL (ref 0.6–1.3)
GLUCOSE SERPL-MCNC: 93 MG/DL (ref 60–99)
HDLC SERPL-MCNC: 52 MG/DL (ref 40–150)
LDLC SERPL CALC-MCNC: 152 MG/DL (ref 0–130)
POTASSIUM SERPL-SCNC: 4.41 MMOL/L (ref 3.5–5.3)
SODIUM SERPL-SCNC: 140.2 MMOL/L (ref 135–146)
TRIGL SERPL-MCNC: 135 MG/DL (ref 0–149)

## 2023-07-27 PROCEDURE — 80048 BASIC METABOLIC PNL TOTAL CA: CPT | Performed by: FAMILY MEDICINE

## 2023-07-27 PROCEDURE — 80061 LIPID PANEL: CPT | Performed by: FAMILY MEDICINE

## 2023-07-27 PROCEDURE — 36415 COLL VENOUS BLD VENIPUNCTURE: CPT | Performed by: FAMILY MEDICINE

## 2023-10-04 ENCOUNTER — ALLIED HEALTH/NURSE VISIT (OUTPATIENT)
Dept: FAMILY MEDICINE | Facility: CLINIC | Age: 63
End: 2023-10-04

## 2023-10-04 DIAGNOSIS — Z23 NEED FOR VACCINATION: Primary | ICD-10-CM

## 2023-10-04 PROCEDURE — 90471 IMMUNIZATION ADMIN: CPT | Performed by: FAMILY MEDICINE

## 2023-10-04 PROCEDURE — 90686 IIV4 VACC NO PRSV 0.5 ML IM: CPT | Performed by: FAMILY MEDICINE

## 2023-10-04 NOTE — NURSING NOTE
-- DO NOT REPLY / DO NOT REPLY ALL --  -- Message is from the [facility]--    COVID-19 Universal Screening: N/A     General Patient Message      Reason for Call: Pharmacy is requesting clarification on the Guanfacine medication, the dose that was prescribed is not available. Please call back.     Caller Information       Type Contact Phone    05/28/2020 01:34 PM Phone (Incoming) [id number] [place], [place] - [address] AT [facility] (Pharmacy) [phone]          Alternative phone number: n/a    Turnaround time given to caller:   \"This message will be sent to [state Provider's name]. The clinical team will fulfill your request as soon as they review your message.\"     Skyla Lopez is here for a flu shot

## 2023-11-05 ENCOUNTER — HEALTH MAINTENANCE LETTER (OUTPATIENT)
Age: 63
End: 2023-11-05

## 2023-11-27 NOTE — PROGRESS NOTES
Assessment & Plan   Problem List Items Addressed This Visit          Behavioral    Panic disorder with agoraphobia and moderate panic attacks--followed by psychiatry    Relevant Medications    sertraline (ZOLOFT) 25 MG tablet     Other Visit Diagnoses       Anxiety        Relevant Medications    sertraline (ZOLOFT) 25 MG tablet    Palpitations        Gastroesophageal reflux disease without esophagitis        Relevant Medications    pantoprazole (PROTONIX) 20 MG EC tablet    Epigastric pain        Relevant Medications    pantoprazole (PROTONIX) 20 MG EC tablet           1. Panic disorder with agoraphobia and moderate panic attacks  She would like us to take over prescribing this medication, she said she has to take 3 pills per day because she is very sensitive. Refilled.  - sertraline (ZOLOFT) 25 MG tablet; Take 1 tablet (25 mg) by mouth 3 times daily  Dispense: 270 tablet; Refill: 1    2. Anxiety  See above.  - sertraline (ZOLOFT) 25 MG tablet; Take 1 tablet (25 mg) by mouth 3 times daily  Dispense: 270 tablet; Refill: 1    3. Palpitations      4. Gastroesophageal reflux disease without esophagitis  Refilled per her request.  - pantoprazole (PROTONIX) 20 MG EC tablet; Take 1 tablet (20 mg) by mouth daily  Dispense: 90 tablet; Refill: 1    5. Epigastric pain    - pantoprazole (PROTONIX) 20 MG EC tablet; Take 1 tablet (20 mg) by mouth daily  Dispense: 90 tablet; Refill: 1              FUTURE APPOINTMENTS:       - Follow-up visit in July when due for fasting labs and refills, medication check.    No follow-ups on file.    Marguerite Knight MD  [facility]    Subjective     Nursing Notes:   Milly Leigh CMA  11/29/2023  1:39 PM  Signed  Chief Complaint   Patient presents with    Recheck Medication     Refill medications, would like us to take over sertraline from her psychiatrist as he has left the practice, has been stable with anxiety for years     Pre-visit Screening:  Immunizations:  not up to  date- shingrix at pharmacy  Colonoscopy:  pt declines  Mammogram: is up to date  Asthma Action Test/Plan:  NA  PHQ9:  NA  GAD7:  NA  Questioned patient about current smoking habits Pt. has never smoked.  Ok to leave detailed message on voice mail for today's visit only Yes, phone # [phone]       Skyla Lopez is a 63 year old female who presents to clinic today for the following health issues   HPI     Here for medications.  She would like prescription of pantoprazole, she has taken this in the past, restarted it and would like a refill. This is working for her and controlling her sx.  For her anxiety, she has taken sertraline for 7-8 years. She has seen psychiatry in the past but has been stable on this dose and the sertraline is working well for her. She would like it filled. Can only take sertraline 25 mg 3 pills per day.         Review of Systems   Constitutional, HEENT, cardiovascular, pulmonary, gi and gu systems are negative, except as otherwise noted.      Objective    /72 (BP Location: Right arm, Patient Position: Sitting, Cuff Size: Adult Large)   Pulse 70   Temp 98.3  F (36.8  C) (Temporal)   LMP 01/01/2014 (Approximate)   SpO2 98%   There is no height or weight on file to calculate BMI.  Physical Exam   GENERAL: healthy, alert and no distress  MS: no gross musculoskeletal defects noted, no edema  NEURO: Normal strength and tone, mentation intact and speech normal  PSYCH: mentation appears normal, affect normal/bright    No results found for any visits on 11/29/23.

## 2023-11-29 ENCOUNTER — OFFICE VISIT (OUTPATIENT)
Dept: FAMILY MEDICINE | Facility: CLINIC | Age: 63
End: 2023-11-29

## 2023-11-29 VITALS
OXYGEN SATURATION: 98 % | HEART RATE: 70 BPM | SYSTOLIC BLOOD PRESSURE: 124 MMHG | TEMPERATURE: 98.3 F | DIASTOLIC BLOOD PRESSURE: 72 MMHG

## 2023-11-29 DIAGNOSIS — R00.2 PALPITATIONS: ICD-10-CM

## 2023-11-29 DIAGNOSIS — F41.9 ANXIETY: ICD-10-CM

## 2023-11-29 DIAGNOSIS — K21.9 GASTROESOPHAGEAL REFLUX DISEASE WITHOUT ESOPHAGITIS: ICD-10-CM

## 2023-11-29 DIAGNOSIS — R10.13 EPIGASTRIC PAIN: ICD-10-CM

## 2023-11-29 DIAGNOSIS — F40.01 PANIC DISORDER WITH AGORAPHOBIA AND MODERATE PANIC ATTACKS: ICD-10-CM

## 2023-11-29 PROCEDURE — 99214 OFFICE O/P EST MOD 30 MIN: CPT | Performed by: FAMILY MEDICINE

## 2023-11-29 RX ORDER — PANTOPRAZOLE SODIUM 20 MG/1
20 TABLET, DELAYED RELEASE ORAL DAILY
Qty: 90 TABLET | Refills: 3 | Status: CANCELLED | OUTPATIENT
Start: 2023-11-29

## 2023-11-29 RX ORDER — SERTRALINE HYDROCHLORIDE 25 MG/1
25 TABLET, FILM COATED ORAL 3 TIMES DAILY
Qty: 270 TABLET | Refills: 1 | Status: SHIPPED | OUTPATIENT
Start: 2023-11-29 | End: 2024-05-28

## 2023-11-29 RX ORDER — PANTOPRAZOLE SODIUM 20 MG/1
20 TABLET, DELAYED RELEASE ORAL DAILY
Qty: 90 TABLET | Refills: 1 | Status: SHIPPED | OUTPATIENT
Start: 2023-11-29 | End: 2024-05-28

## 2023-11-29 RX ORDER — ATENOLOL 25 MG/1
25 TABLET ORAL DAILY
Qty: 90 TABLET | Refills: 3 | Status: CANCELLED | OUTPATIENT
Start: 2023-11-29

## 2023-11-29 ASSESSMENT — ANXIETY QUESTIONNAIRES
7. FEELING AFRAID AS IF SOMETHING AWFUL MIGHT HAPPEN: NOT AT ALL
GAD7 TOTAL SCORE: 0
IF YOU CHECKED OFF ANY PROBLEMS ON THIS QUESTIONNAIRE, HOW DIFFICULT HAVE THESE PROBLEMS MADE IT FOR YOU TO DO YOUR WORK, TAKE CARE OF THINGS AT HOME, OR GET ALONG WITH OTHER PEOPLE: NOT DIFFICULT AT ALL
GAD7 TOTAL SCORE: 0
2. NOT BEING ABLE TO STOP OR CONTROL WORRYING: NOT AT ALL
6. BECOMING EASILY ANNOYED OR IRRITABLE: NOT AT ALL
1. FEELING NERVOUS, ANXIOUS, OR ON EDGE: NOT AT ALL
3. WORRYING TOO MUCH ABOUT DIFFERENT THINGS: NOT AT ALL
5. BEING SO RESTLESS THAT IT IS HARD TO SIT STILL: NOT AT ALL

## 2023-11-29 ASSESSMENT — PATIENT HEALTH QUESTIONNAIRE - PHQ9
SUM OF ALL RESPONSES TO PHQ QUESTIONS 1-9: 1
5. POOR APPETITE OR OVEREATING: NOT AT ALL

## 2023-11-29 NOTE — NURSING NOTE
Chief Complaint   Patient presents with    Recheck Medication     Refill medications, would like us to take over sertraline from her psychiatrist as he has left the practice, has been stable with anxiety for years     Pre-visit Screening:  Immunizations:  not up to date- shingrix at pharmacy  Colonoscopy:  pt declines  Mammogram: is up to date  Asthma Action Test/Plan:  NA  PHQ9:  NA  GAD7:  NA  Questioned patient about current smoking habits Pt. has never smoked.  Ok to leave detailed message on voice mail for today's visit only Yes, phone # [phone]

## 2024-06-05 NOTE — PROGRESS NOTES
Assessment & Plan   Problem List Items Addressed This Visit          Behavioral    Panic disorder with agoraphobia and moderate panic attacks--followed by psychiatry    Relevant Medications    sertraline (ZOLOFT) 25 MG tablet     Other Visit Diagnoses       Gastroesophageal reflux disease without esophagitis        Relevant Medications    pantoprazole (PROTONIX) 20 MG EC tablet    Epigastric pain        Relevant Medications    pantoprazole (PROTONIX) 20 MG EC tablet    Anxiety        Relevant Medications    sertraline (ZOLOFT) 25 MG tablet    Palpitations        Relevant Medications    atenolol (TENORMIN) 25 MG tablet    Other Relevant Orders    VENOUS COLLECTION (Completed)    Lipid Panel (BFP)    Comprehensive Metobolic Panel (BFP)             1. Gastroesophageal reflux disease without esophagitis  She is doing well on medications, refilled.  - pantoprazole (PROTONIX) 20 MG EC tablet; Take 1 tablet (20 mg) by mouth daily  Dispense: 90 tablet; Refill: 3    2. Epigastric pain  Symptoms are controlled, refilled.  - pantoprazole (PROTONIX) 20 MG EC tablet; Take 1 tablet (20 mg) by mouth daily  Dispense: 90 tablet; Refill: 3    3. Panic disorder with agoraphobia and moderate panic attacks  Controlled, refilled.  - sertraline (ZOLOFT) 25 MG tablet; Take 1 tablet (25 mg) by mouth 3 times daily  Dispense: 270 tablet; Refill: 3    4. Anxiety  Refilled.  - sertraline (ZOLOFT) 25 MG tablet; Take 1 tablet (25 mg) by mouth 3 times daily  Dispense: 270 tablet; Refill: 3    5. Palpitations    - atenolol (TENORMIN) 25 MG tablet; Take 1 tablet (25 mg) by mouth daily  Dispense: 90 tablet; Refill: 3  - VENOUS COLLECTION  - Lipid Panel (BFP)  - Comprehensive Metobolic Panel (BFP)            FUTURE APPOINTMENTS:       - Follow-up visit in 1 year, we manage her chronic medical care.    No follow-ups on file.    Marguerite Knight MD  [facility]    Subjective     Nursing Notes:   Milly Leigh CMA  6/10/2024  7:38 AM   Signed  Chief Complaint   Patient presents with    Recheck Medication     Fasting today, refill medications     Pre-visit Screening:  Immunizations:  not up to date - shingrix at pharmacy  Colonoscopy:  pt declines  Mammogram: has scheduled  Asthma Action Test/Plan:  NA  PHQ9:  Done today  GAD7:  Done today  Questioned patient about current smoking habits Pt. has never smoked.  Ok to leave detailed message on voice mail for today's visit only Yes, phone # [phone]       Skyla Lopez is a 63 year old female who presents to clinic today for the following health issues   HPI     Here to followup on her medications, due for fasting labs and refills.  Gerd: controlled on medications.  Sertraline for mood: controlled on medications.  Palpitations: controlled, taking atenolol daily.    Goes to [place] [facility], will schedule her annual physical. Has mammogram at  scheduled for wednesday        Review of Systems   Constitutional, HEENT, cardiovascular, pulmonary, gi and gu systems are negative, except as otherwise noted.      Objective    /72 (BP Location: Right arm, Patient Position: Sitting, Cuff Size: Adult Regular)   Pulse 61   Temp 98  F (36.7  C) (Temporal)   Wt 82.6 kg (182 lb)   LMP 01/01/2014 (Approximate)   SpO2 97%   BMI 23.37 kg/m    Body mass index is 23.37 kg/m .  Physical Exam   GENERAL: alert and no distress  RESP: lungs clear to auscultation - no rales, rhonchi or wheezes  CV: regular rate and rhythm, normal S1 S2, no S3 or S4, no murmur, click or rub, no peripheral edema  MS: no gross musculoskeletal defects noted, no edema  NEURO: Normal strength and tone, mentation intact and speech normal  PSYCH: mentation appears normal, affect normal/bright    No results found for any visits on 06/10/24.

## 2024-06-10 ENCOUNTER — OFFICE VISIT (OUTPATIENT)
Dept: FAMILY MEDICINE | Facility: CLINIC | Age: 64
End: 2024-06-10

## 2024-06-10 VITALS
HEART RATE: 61 BPM | OXYGEN SATURATION: 97 % | BODY MASS INDEX: 23.37 KG/M2 | WEIGHT: 182 LBS | DIASTOLIC BLOOD PRESSURE: 72 MMHG | SYSTOLIC BLOOD PRESSURE: 120 MMHG | TEMPERATURE: 98 F

## 2024-06-10 DIAGNOSIS — F40.01 PANIC DISORDER WITH AGORAPHOBIA AND MODERATE PANIC ATTACKS: ICD-10-CM

## 2024-06-10 DIAGNOSIS — R10.13 EPIGASTRIC PAIN: ICD-10-CM

## 2024-06-10 DIAGNOSIS — R00.2 PALPITATIONS: ICD-10-CM

## 2024-06-10 DIAGNOSIS — F41.9 ANXIETY: ICD-10-CM

## 2024-06-10 DIAGNOSIS — K21.9 GASTROESOPHAGEAL REFLUX DISEASE WITHOUT ESOPHAGITIS: ICD-10-CM

## 2024-06-10 LAB
ALBUMIN SERPL-MCNC: 4.2 G/DL (ref 3.6–5.1)
ALP SERPL-CCNC: 47 U/L (ref 33–130)
ALT 1742-6: 12 U/L (ref 0–32)
AST 1920-8: 11 U/L (ref 0–35)
BILIRUB SERPL-MCNC: 0.6 MG/DL (ref 0.2–1.2)
BUN SERPL-MCNC: 16 MG/DL (ref 7–25)
BUN/CREATININE RATIO: 19 (ref 6–32)
CALCIUM SERPL-MCNC: 8.8 MG/DL (ref 8.6–10.3)
CHLORIDE SERPLBLD-SCNC: 104.1 MMOL/L (ref 98–110)
CHOLEST SERPL-MCNC: 231 MG/DL (ref 0–199)
CHOLEST/HDLC SERPL: 4 {RATIO} (ref 0–5)
CO2 SERPL-SCNC: 30.3 MMOL/L (ref 20–32)
CREAT SERPL-MCNC: 0.84 MG/DL (ref 0.6–1.3)
GLUCOSE SERPL-MCNC: 91 MG/DL (ref 60–99)
HDLC SERPL-MCNC: 57 MG/DL (ref 40–150)
LDLC SERPL CALC-MCNC: 147 MG/DL
POTASSIUM SERPL-SCNC: 4.28 MMOL/L (ref 3.5–5.3)
PROT SERPL-MCNC: 6.7 G/DL (ref 6.1–8.1)
SODIUM SERPL-SCNC: 139.2 MMOL/L (ref 135–146)
TRIGL SERPL-MCNC: 134 MG/DL (ref 0–149)

## 2024-06-10 PROCEDURE — 99213 OFFICE O/P EST LOW 20 MIN: CPT | Performed by: FAMILY MEDICINE

## 2024-06-10 PROCEDURE — 36415 COLL VENOUS BLD VENIPUNCTURE: CPT | Performed by: FAMILY MEDICINE

## 2024-06-10 PROCEDURE — 80061 LIPID PANEL: CPT | Performed by: FAMILY MEDICINE

## 2024-06-10 PROCEDURE — 80053 COMPREHEN METABOLIC PANEL: CPT | Performed by: FAMILY MEDICINE

## 2024-06-10 PROCEDURE — G2211 COMPLEX E/M VISIT ADD ON: HCPCS | Performed by: FAMILY MEDICINE

## 2024-06-10 RX ORDER — ATENOLOL 25 MG/1
25 TABLET ORAL DAILY
Qty: 90 TABLET | Refills: 3 | Status: SHIPPED | OUTPATIENT
Start: 2024-06-10

## 2024-06-10 RX ORDER — SERTRALINE HYDROCHLORIDE 25 MG/1
25 TABLET, FILM COATED ORAL 3 TIMES DAILY
Qty: 270 TABLET | Refills: 3 | Status: SHIPPED | OUTPATIENT
Start: 2024-06-10

## 2024-06-10 RX ORDER — PANTOPRAZOLE SODIUM 20 MG/1
20 TABLET, DELAYED RELEASE ORAL DAILY
Qty: 90 TABLET | Refills: 3 | Status: SHIPPED | OUTPATIENT
Start: 2024-06-10

## 2024-06-10 ASSESSMENT — ANXIETY QUESTIONNAIRES
1. FEELING NERVOUS, ANXIOUS, OR ON EDGE: NOT AT ALL
3. WORRYING TOO MUCH ABOUT DIFFERENT THINGS: NOT AT ALL
7. FEELING AFRAID AS IF SOMETHING AWFUL MIGHT HAPPEN: NOT AT ALL
5. BEING SO RESTLESS THAT IT IS HARD TO SIT STILL: NOT AT ALL
GAD7 TOTAL SCORE: 0
2. NOT BEING ABLE TO STOP OR CONTROL WORRYING: NOT AT ALL
IF YOU CHECKED OFF ANY PROBLEMS ON THIS QUESTIONNAIRE, HOW DIFFICULT HAVE THESE PROBLEMS MADE IT FOR YOU TO DO YOUR WORK, TAKE CARE OF THINGS AT HOME, OR GET ALONG WITH OTHER PEOPLE: NOT DIFFICULT AT ALL
6. BECOMING EASILY ANNOYED OR IRRITABLE: NOT AT ALL
GAD7 TOTAL SCORE: 0

## 2024-06-10 ASSESSMENT — PATIENT HEALTH QUESTIONNAIRE - PHQ9
5. POOR APPETITE OR OVEREATING: NOT AT ALL
SUM OF ALL RESPONSES TO PHQ QUESTIONS 1-9: 1

## 2024-06-10 NOTE — NURSING NOTE
Chief Complaint   Patient presents with    Recheck Medication     Fasting today, refill medications     Pre-visit Screening:  Immunizations:  not up to date - shingrix at pharmacy  Colonoscopy:  pt declines  Mammogram: has scheduled  Asthma Action Test/Plan:  NA  PHQ9:  Done today  GAD7:  Done today  Questioned patient about current smoking habits Pt. has never smoked.  Ok to leave detailed message on voice mail for today's visit only Yes, phone # [phone]

## 2024-06-12 ENCOUNTER — HOSPITAL ENCOUNTER (OUTPATIENT)
Dept: MAMMOGRAPHY | Facility: CLINIC | Age: 64
Discharge: HOME | End: 2024-06-12
Attending: FAMILY MEDICINE | Admitting: FAMILY MEDICINE
Payer: COMMERCIAL

## 2024-06-12 DIAGNOSIS — Z12.31 VISIT FOR SCREENING MAMMOGRAM: ICD-10-CM

## 2024-06-12 PROCEDURE — 77063 BREAST TOMOSYNTHESIS BI: CPT

## 2024-08-14 ENCOUNTER — TRANSFERRED RECORDS (OUTPATIENT)
Dept: HEALTH INFORMATION MANAGEMENT | Facility: CLINIC | Age: 64
End: 2024-08-14
Payer: COMMERCIAL

## 2024-12-02 NOTE — PROGRESS NOTES
Assessment & Plan   Problem List Items Addressed This Visit    None  Visit Diagnoses       Bloating    -  Primary    Relevant Orders    VENOUS COLLECTION (Completed)    HEMOGRAM PLATELET DIFF (BFP) (Completed)    Comprehensive Metobolic Panel (BFP)    Lipase ([facility])    Radiology Referral (Affiliate Use Only)    US Abdomen Complete             1. Bloating (Primary)  This sounds GI related. Her symptoms improve with gaviscon. Offered in clinic maalox, declined. Check labs. She would like a repeat ultrasound. Last one done more than 3 years ago, I think this is ok to repeat, however, this may just be something viral. It sounds like it's improving. Continue her protonix. Add gaviscon or tums as needed. Recheck with me in clinic in 1-2 weeks, sooner if worsening or change in symptoms.  Also since the symptoms are in lower mid epigastric area and sometimes into the mid back, we discussed cardiac possibility, in which case she would need to be seen in the ER. She will watch her symptoms later on today and go to the ER if worsening.  She does not have pain now.  - VENOUS COLLECTION  - HEMOGRAM PLATELET DIFF (BFP)  - Comprehensive Metobolic Panel (BFP)  - Lipase ([facility])  - Radiology Referral (Affiliate Use Only)  - US Abdomen Complete            FUTURE APPOINTMENTS:       - Follow-up visit per results. We manage her chronic medical care.    No follow-ups on file.    Marguerite Knight MD  [facility]    Subjective     Nursing Notes:   Milly Leigh CMA  12/3/2024 10:02 AM  Signed  Chief Complaint   Patient presents with    Gastrointestinal Problem     Bloating belching and gassy for the last week, epigastric discomfort, Saturday she woke up in the middle of the night to nausea/vomit, family history of gallbladder issues      Pre-visit Screening:  Immunizations:  not up to date - shingrix at pharmacy   Colonoscopy:  is due and to be scheduled by patient for later completion  Mammogram: is up to date  Asthma  Action Test/Plan:  NA  PHQ9:  NA  GAD7:  NA  Questioned patient about current smoking habits Pt. has never smoked.  Ok to leave detailed message on voice mail for today's visit only Yes, phone # [phone]       Skyla Lopez is a 64 year old female who presents to clinic today for the following health issues   HPI     Here for possible gallbladder, last week had epigastric pain and belching, then diarrhea, vomited. Is on protonix and is taking this regularly. No fevers. No particular foods seemed to affect this. Many in her family have had gallbladder problems. Wants an ultrasound.   Us in 2021 was negative.  Was feeling better today. Has a fiery heartburn. Has some discomfort into the mid back area. Gaviscon seemed to help.         Review of Systems   Constitutional, HEENT, cardiovascular, pulmonary, gi and gu systems are negative, except as otherwise noted.      Objective    /74 (BP Location: Right arm, Patient Position: Sitting, Cuff Size: Adult Large)   Pulse 64   Temp 97.8  F (36.6  C) (Temporal)   Wt 82.1 kg (181 lb)   LMP 01/01/2014 (Approximate)   SpO2 98%   BMI 23.24 kg/m    Body mass index is 23.24 kg/m .  Physical Exam   GENERAL: alert and no distress  RESP: lungs clear to auscultation - no rales, rhonchi or wheezes  CV: regular rate and rhythm, normal S1 S2, no S3 or S4, no murmur, click or rub, no peripheral edema  ABDOMEN: soft, nontender, no hepatosplenomegaly, no masses and bowel sounds normal  MS: no gross musculoskeletal defects noted, no edema  PSYCH: mentation appears normal, affect normal/bright    Results for orders placed or performed in visit on 12/03/24   HEMOGRAM PLATELET DIFF (BFP)     Status: None   Result Value Ref Range    WBC 5.9 4.0 - 11 10*9/L    RBC Count 4.23 3.8 - 5.2 10*12/L    Hemoglobin 12.8 11.7 - 15.7 g/dL    Hematocrit 40.7 35.0 - 47.0 %    MCV 96.2 78 - 100 fL    MCH 30.3 26 - 33 pg    MCHC 31.4 31 - 36 g/dL    Platelet Count 285 150 - 375 10^9/L    %  Granulocytes 58.1 %    % Lymphocytes 33.0 %    % Monocytes 8.9 %

## 2024-12-03 ENCOUNTER — OFFICE VISIT (OUTPATIENT)
Dept: FAMILY MEDICINE | Facility: CLINIC | Age: 64
End: 2024-12-03

## 2024-12-03 VITALS
SYSTOLIC BLOOD PRESSURE: 118 MMHG | OXYGEN SATURATION: 98 % | HEART RATE: 64 BPM | DIASTOLIC BLOOD PRESSURE: 74 MMHG | BODY MASS INDEX: 23.24 KG/M2 | WEIGHT: 181 LBS | TEMPERATURE: 97.8 F

## 2024-12-03 DIAGNOSIS — R14.0 BLOATING: Primary | ICD-10-CM

## 2024-12-03 LAB
% GRANULOCYTES: 58.1 %
ALBUMIN SERPL-MCNC: 4.3 G/DL (ref 3.6–5.1)
ALP SERPL-CCNC: 50 U/L (ref 33–130)
ALT 1742-6: 7 U/L (ref 0–32)
AST 1920-8: 6 U/L (ref 0–35)
BILIRUB SERPL-MCNC: 0.6 MG/DL (ref 0.2–1.2)
BUN SERPL-MCNC: 15 MG/DL (ref 7–25)
BUN/CREATININE RATIO: 18 (ref 6–32)
CALCIUM SERPL-MCNC: 9.8 MG/DL (ref 8.6–10.3)
CHLORIDE SERPLBLD-SCNC: 104.2 MMOL/L (ref 98–110)
CO2 SERPL-SCNC: 27.8 MMOL/L (ref 20–32)
CREAT SERPL-MCNC: 0.84 MG/DL (ref 0.6–1.3)
GLUCOSE SERPL-MCNC: 91 MG/DL (ref 60–99)
HCT VFR BLD AUTO: 40.7 % (ref 35–47)
HEMOGLOBIN: 12.8 G/DL (ref 11.7–15.7)
LYMPHOCYTES NFR BLD AUTO: 33 %
MCH RBC QN AUTO: 30.3 PG (ref 26–33)
MCHC RBC AUTO-ENTMCNC: 31.4 G/DL (ref 31–36)
MCV RBC AUTO: 96.2 FL (ref 78–100)
MONOCYTES NFR BLD AUTO: 8.9 %
PLATELET COUNT - QUEST: 285 10^9/L (ref 150–375)
POTASSIUM SERPL-SCNC: 4.53 MMOL/L (ref 3.5–5.3)
PROT SERPL-MCNC: 7 G/DL (ref 6.1–8.1)
RBC # BLD AUTO: 4.23 10*12/L (ref 3.8–5.2)
SODIUM SERPL-SCNC: 139.7 MMOL/L (ref 135–146)
WBC # BLD AUTO: 5.9 10*9/L (ref 4–11)

## 2024-12-03 NOTE — NURSING NOTE
Chief Complaint   Patient presents with    Gastrointestinal Problem     Bloating belching and gassy for the last week, epigastric discomfort, Saturday she woke up in the middle of the night to nausea/vomit, family history of gallbladder issues      Pre-visit Screening:  Immunizations:  not up to date - shingrix at pharmacy   Colonoscopy:  is due and to be scheduled by patient for later completion  Mammogram: is up to date  Asthma Action Test/Plan:  NA  PHQ9:  NA  GAD7:  NA  Questioned patient about current smoking habits Pt. has never smoked.  Ok to leave detailed message on voice mail for today's visit only Yes, phone # [phone]

## 2024-12-04 LAB — LIPASE SERPL-CCNC: 26 U/L (ref 7–60)

## 2024-12-22 ENCOUNTER — HEALTH MAINTENANCE LETTER (OUTPATIENT)
Age: 64
End: 2024-12-22

## 2025-06-16 DIAGNOSIS — F40.01 PANIC DISORDER WITH AGORAPHOBIA AND MODERATE PANIC ATTACKS: ICD-10-CM

## 2025-06-16 DIAGNOSIS — F41.9 ANXIETY: ICD-10-CM

## 2025-06-17 RX ORDER — SERTRALINE HYDROCHLORIDE 25 MG/1
25 TABLET, FILM COATED ORAL 3 TIMES DAILY
COMMUNITY
Start: 2025-06-17

## 2025-06-17 NOTE — TELEPHONE ENCOUNTER
Skyla Lopez is requesting a refill of:    Refused Prescriptions:                       Disp   Refills    sertraline (ZOLOFT) 25 MG tablet [Pharmacy*                Sig: TAKE 1 TABLET(25 MG) BY MOUTH THREE TIMES DAILY  Refused By: YADIEL LENNON  Reason for Refusal: OTHER  Reason for Refusal Comment: #42 tablets sent on 06/13/25, pt due for OV

## 2025-06-24 ENCOUNTER — OFFICE VISIT (OUTPATIENT)
Dept: FAMILY MEDICINE | Facility: CLINIC | Age: 65
End: 2025-06-24

## 2025-06-24 ENCOUNTER — RESULTS FOLLOW-UP (OUTPATIENT)
Dept: FAMILY MEDICINE | Facility: CLINIC | Age: 65
End: 2025-06-24

## 2025-06-24 VITALS
DIASTOLIC BLOOD PRESSURE: 74 MMHG | SYSTOLIC BLOOD PRESSURE: 118 MMHG | WEIGHT: 180 LBS | TEMPERATURE: 97.9 F | HEART RATE: 86 BPM | OXYGEN SATURATION: 98 % | BODY MASS INDEX: 23.11 KG/M2

## 2025-06-24 DIAGNOSIS — F40.01 PANIC DISORDER WITH AGORAPHOBIA AND MODERATE PANIC ATTACKS: ICD-10-CM

## 2025-06-24 DIAGNOSIS — N62 LARGE BREASTS: ICD-10-CM

## 2025-06-24 DIAGNOSIS — R10.13 EPIGASTRIC PAIN: ICD-10-CM

## 2025-06-24 DIAGNOSIS — Z00.00 ENCOUNTER FOR ROUTINE HISTORY AND PHYSICAL EXAM IN FEMALE PATIENT: Primary | ICD-10-CM

## 2025-06-24 DIAGNOSIS — K21.9 GASTROESOPHAGEAL REFLUX DISEASE WITHOUT ESOPHAGITIS: ICD-10-CM

## 2025-06-24 DIAGNOSIS — F41.9 ANXIETY: ICD-10-CM

## 2025-06-24 DIAGNOSIS — R00.2 PALPITATIONS: ICD-10-CM

## 2025-06-24 LAB
BUN SERPL-MCNC: 23 MG/DL (ref 7–25)
BUN/CREATININE RATIO: 24 (ref 6–32)
CALCIUM SERPL-MCNC: 9.3 MG/DL (ref 8.6–10.3)
CHLORIDE SERPLBLD-SCNC: 103 MMOL/L (ref 98–110)
CHOLEST SERPL-MCNC: 256 MG/DL (ref 0–199)
CHOLEST/HDLC SERPL: 4 {RATIO} (ref 0–5)
CO2 SERPL-SCNC: 30 MMOL/L (ref 20–32)
CREAT SERPL-MCNC: 0.94 MG/DL (ref 0.6–1.3)
GLUCOSE SERPL-MCNC: 94 MG/DL (ref 60–99)
HDLC SERPL-MCNC: 64 MG/DL (ref 40–150)
LDLC SERPL CALC-MCNC: 168 MG/DL (ref 0–129)
POTASSIUM SERPL-SCNC: 4.28 MMOL/L (ref 3.5–5.3)
SODIUM SERPL-SCNC: 139.7 MMOL/L (ref 135–146)
TRIGL SERPL-MCNC: 122 MG/DL (ref 0–149)

## 2025-06-24 PROCEDURE — 99396 PREV VISIT EST AGE 40-64: CPT | Performed by: FAMILY MEDICINE

## 2025-06-24 PROCEDURE — 36415 COLL VENOUS BLD VENIPUNCTURE: CPT | Performed by: FAMILY MEDICINE

## 2025-06-24 PROCEDURE — 99213 OFFICE O/P EST LOW 20 MIN: CPT | Mod: 25 | Performed by: FAMILY MEDICINE

## 2025-06-24 PROCEDURE — 80048 BASIC METABOLIC PNL TOTAL CA: CPT | Performed by: FAMILY MEDICINE

## 2025-06-24 PROCEDURE — 80061 LIPID PANEL: CPT | Performed by: FAMILY MEDICINE

## 2025-06-24 PROCEDURE — 96127 BRIEF EMOTIONAL/BEHAV ASSMT: CPT | Performed by: FAMILY MEDICINE

## 2025-06-24 RX ORDER — PANTOPRAZOLE SODIUM 20 MG/1
20 TABLET, DELAYED RELEASE ORAL DAILY
Qty: 90 TABLET | Refills: 1 | Status: SHIPPED | OUTPATIENT
Start: 2025-06-24

## 2025-06-24 RX ORDER — SERTRALINE HYDROCHLORIDE 25 MG/1
25 TABLET, FILM COATED ORAL 3 TIMES DAILY
Qty: 135 TABLET | Refills: 1 | Status: SHIPPED | OUTPATIENT
Start: 2025-06-24

## 2025-06-24 RX ORDER — ATENOLOL 25 MG/1
25 TABLET ORAL DAILY
Qty: 90 TABLET | Refills: 1 | Status: SHIPPED | OUTPATIENT
Start: 2025-06-24

## 2025-06-24 ASSESSMENT — ANXIETY QUESTIONNAIRES
5. BEING SO RESTLESS THAT IT IS HARD TO SIT STILL: NOT AT ALL
IF YOU CHECKED OFF ANY PROBLEMS ON THIS QUESTIONNAIRE, HOW DIFFICULT HAVE THESE PROBLEMS MADE IT FOR YOU TO DO YOUR WORK, TAKE CARE OF THINGS AT HOME, OR GET ALONG WITH OTHER PEOPLE: NOT DIFFICULT AT ALL
3. WORRYING TOO MUCH ABOUT DIFFERENT THINGS: NOT AT ALL
GAD7 TOTAL SCORE: 0
2. NOT BEING ABLE TO STOP OR CONTROL WORRYING: NOT AT ALL
6. BECOMING EASILY ANNOYED OR IRRITABLE: NOT AT ALL
1. FEELING NERVOUS, ANXIOUS, OR ON EDGE: NOT AT ALL
7. FEELING AFRAID AS IF SOMETHING AWFUL MIGHT HAPPEN: NOT AT ALL
GAD7 TOTAL SCORE: 0

## 2025-06-24 ASSESSMENT — PATIENT HEALTH QUESTIONNAIRE - PHQ9
SUM OF ALL RESPONSES TO PHQ QUESTIONS 1-9: 0
5. POOR APPETITE OR OVEREATING: NOT AT ALL

## 2025-06-24 NOTE — NURSING NOTE
Chief Complaint   Patient presents with    Recheck Medication     Refill medications    Physical     Fasting today       Pre-visit Screening:  Immunizations:  not up to date - shingrix at pharmacy, she will think about prevnar 20  Colonoscopy:  pt declines  Mammogram: is up to date  Asthma Action Test/Plan:  NA  PHQ9:  DOne today  GAD7:  Done today  Questioned patient about current smoking habits Pt. has never smoked.  Ok to leave detailed message on voice mail for today's visit only Yes, phone # [phone]

## 2025-06-24 NOTE — PROGRESS NOTES
Preventive Care Visit  [facility]  Marguerite Knight MD, Family Medicine  Jun 24, 2025       SUBJECTIVE:   Jennifer is a 64 year old, presenting for the following:  Recheck Medication (Refill medications) and Physical (Fasting today)      HPI              Here for a physical. Fasting and due for medication refills.  Palpitations: atenolol is working well.  Protonix: controls her GERD sx  Sertraline: controlling her anxiety and is doing well.    Notes large breasts that cause back pain, considering surgery. Not sure yet if she wants a referral.      Social History     Tobacco Use    Smoking status: Never     Passive exposure: Never    Smokeless tobacco: Never   Substance Use Topics    Alcohol use: No     Alcohol/week: 0.0 standard drinks of alcohol              No data to display            No concerns  Reviewed orders with patient.  Reviewed health maintenance and updated orders accordingly - Yes  BP Readings from Last 3 Encounters:   06/24/25 118/74   12/03/24 118/74   06/10/24 120/72    Wt Readings from Last 3 Encounters:   06/24/25 81.6 kg (180 lb)   12/03/24 82.1 kg (181 lb)   06/10/24 82.6 kg (182 lb)                  Patient Active Problem List   Diagnosis    Mitral valve insufficiency and aortic valve insufficiency    Tachycardia    ACP (advance care planning)    High serum vitamin B12     Past Surgical History:   Procedure Laterality Date    APPENDECTOMY      CRYOPRESERVATION REPRODUCTIVE TISSUE, OVARIAN  01/01/1982    left ovary removed/borderline tumor    DILATION AND CURETTAGE, OPERATIVE HYSTEROSCOPY WITH MORCELLATOR, COMBINED N/A 06/04/2015    Procedure: COMBINED DILATION AND CURETTAGE, OPERATIVE HYSTEROSCOPY WITH MORCELLATOR;  Surgeon: Hamida Prakash MD;  Location: [facility] REMOVAL OF TONSILS,12+ Y/O  age 23    Tonsils 12+y.o.    LAPAROSCOPIC FUNDOPLASTY  1990/92/93    endometriosis       Social History     Tobacco Use    Smoking status: Never     Passive exposure: Never     Smokeless tobacco: Never   Substance Use Topics    Alcohol use: No     Alcohol/week: 0.0 standard drinks of alcohol     Family History   Problem Relation Age of Onset    Prostate Cancer Father     Parkinsonism Father     C.A.D. Father         age 66    Hyperlipidemia Father     Breast Cancer Paternal Aunt          Current Outpatient Medications   Medication Sig Dispense Refill    Ascorbic Acid (VITAMIN C) 500 MG CAPS       atenolol (TENORMIN) 25 MG tablet Take 1 tablet (25 mg) by mouth daily. 90 tablet 1    Lactobacillus (PROBIOTIC ACIDOPHILUS) CAPS       Multiple Vitamins-Minerals (MULTIVITAMIN ADULT PO)       pantoprazole (PROTONIX) 20 MG EC tablet Take 1 tablet (20 mg) by mouth daily. 90 tablet 1    sertraline (ZOLOFT) 25 MG tablet Take 1 tablet (25 mg) by mouth 3 times daily. 135 tablet 1    Vitamin D, Cholecalciferol, 1000 units CAPS          Breast Cancer Screening:    FHS-7:       10/18/2021     9:30 AM 2/16/2023    10:22 AM 6/12/2024     9:58 AM   Breast CA Risk Assessment (FHS-7)   Did any of your first-degree relatives have breast or ovarian cancer? No No No   Did any of your relatives have bilateral breast cancer? No No No   Did any man in your family have breast cancer? No No No   Did any woman in your family have breast and ovarian cancer? No No No   Did any woman in your family have breast cancer before age 50 y? No No No   Do you have 2 or more relatives with breast and/or ovarian cancer? No No No   Do you have 2 or more relatives with breast and/or bowel cancer? No No No     Mammogram through gynecology    Pertinent mammograms are reviewed under the imaging tab.      History of abnormal Pap smear: knows she is due for pap, will see gynecology            Reviewed and updated as needed this visit by clinical staff   Tobacco  Allergies   Problems             Reviewed and updated as needed this visit by Provider                  Past Medical History:   Diagnosis Date    Endometrial polyp 05/29/2015     "Generalized anxiety disorder 1993    Palpitations     PONV (postoperative nausea and vomiting)     Tachycardia, unspecified     controlled with med, started in 6th grade, last episode 1995      Past Surgical History:   Procedure Laterality Date    APPENDECTOMY      CRYOPRESERVATION REPRODUCTIVE TISSUE, OVARIAN  01/01/1982    left ovary removed/borderline tumor    DILATION AND CURETTAGE, OPERATIVE HYSTEROSCOPY WITH MORCELLATOR, COMBINED N/A 06/04/2015    Procedure: COMBINED DILATION AND CURETTAGE, OPERATIVE HYSTEROSCOPY WITH MORCELLATOR;  Surgeon: Hamida Prakash MD;  Location: [facility] REMOVAL OF TONSILS,12+ Y/O  age 23    Tonsils 12+y.o.    LAPAROSCOPIC FUNDOPLASTY  1990/92/93    endometriosis     Review of Systems    Review of Systems  Constitutional, HEENT, cardiovascular, pulmonary, gi and gu systems are negative, except as otherwise noted.    OBJECTIVE:   /74 (BP Location: Right arm, Patient Position: Sitting, Cuff Size: Adult Regular)   Pulse 86   Temp 97.9  F (36.6  C) (Temporal)   Wt 81.6 kg (180 lb)   LMP 01/01/2014 (Approximate)   SpO2 98%   BMI 23.11 kg/m     Estimated body mass index is 23.11 kg/m  as calculated from the following:    Height as of 7/21/23: 1.88 m (6' 2\").    Weight as of this encounter: 81.6 kg (180 lb).  Physical Exam  GENERAL: alert and no distress  EYES: Eyes grossly normal to inspection, PERRL and conjunctivae and sclerae normal  HENT: ear canals and TM's normal, nose and mouth without ulcers or lesions  NECK: no adenopathy, no asymmetry, masses, or scars  RESP: lungs clear to auscultation - no rales, rhonchi or wheezes  BREAST: normal without masses, tenderness or nipple discharge and no palpable axillary masses or adenopathy  CV: regular rate and rhythm, normal S1 S2, no S3 or S4, no murmur, click or rub, no peripheral edema  ABDOMEN: soft, nontender, no hepatosplenomegaly, no masses and bowel sounds normal  MS: no gross musculoskeletal defects noted, no " edema  SKIN: no suspicious lesions or rashes  NEURO: Normal strength and tone, mentation intact and speech normal  PSYCH: mentation appears normal, affect normal/bright  Declined pelvic    Diagnostic Test Results:  Labs reviewed in Epic  No results found for this or any previous visit (from the past 24 hours).    ASSESSMENT/PLAN:     1. Encounter for routine history and physical exam in female patient (Primary)    - VENOUS COLLECTION  - Lipid Panel (BFP)  - Basic Metabolic Panel (BFP)    2. Palpitations  Refilled.  - atenolol (TENORMIN) 25 MG tablet; Take 1 tablet (25 mg) by mouth daily.  Dispense: 90 tablet; Refill: 1    3. Gastroesophageal reflux disease without esophagitis  Controlled on medications, refilled.  - pantoprazole (PROTONIX) 20 MG EC tablet; Take 1 tablet (20 mg) by mouth daily.  Dispense: 90 tablet; Refill: 1    4. Epigastric pain    - pantoprazole (PROTONIX) 20 MG EC tablet; Take 1 tablet (20 mg) by mouth daily.  Dispense: 90 tablet; Refill: 1    5. Panic disorder with agoraphobia and moderate panic attacks  Controlled, refilled.  - sertraline (ZOLOFT) 25 MG tablet; Take 1 tablet (25 mg) by mouth 3 times daily.  Dispense: 135 tablet; Refill: 1    6. Anxiety  Refilled.  - sertraline (ZOLOFT) 25 MG tablet; Take 1 tablet (25 mg) by mouth 3 times daily.  Dispense: 135 tablet; Refill: 1    7. Large breasts  She has concerns about this, considering breast reduction surgery, discussed referrals, she will let me know if she wants this referral.   Patient has been advised of split billing requirements and indicates understanding: Yes      Counseling  Reviewed preventive health counseling, as reflected in patient instructions       Regular exercise       Healthy diet/nutrition        She reports that she has never smoked. She has never been exposed to tobacco smoke. She has never used smokeless tobacco.          Signed Electronically by: Marguerite Knight MD

## 2025-07-31 ENCOUNTER — TELEPHONE (OUTPATIENT)
Dept: FAMILY MEDICINE | Facility: CLINIC | Age: 65
End: 2025-07-31

## 2025-07-31 NOTE — TELEPHONE ENCOUNTER
Skyla M Ericson called the clinic support line with the following:    Has had diarrhea since Monday. She had an upset stomach on Sunday night. Was still having some loose stools today. Took Imodium.   Advised to keep pushing fluids, imodium and BRAT diet.  If she develops abdominal pain or blood in stool to be seen. She will call in the morning to cancel her appointment if better in the morning.  Advised can take over a week to get better if viral.